# Patient Record
Sex: FEMALE | Race: WHITE | Employment: UNEMPLOYED | URBAN - METROPOLITAN AREA
[De-identification: names, ages, dates, MRNs, and addresses within clinical notes are randomized per-mention and may not be internally consistent; named-entity substitution may affect disease eponyms.]

---
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
## 2022-03-30 ENCOUNTER — OFFICE VISIT (OUTPATIENT)
Dept: FAMILY MEDICINE | Facility: CLINIC | Age: 22
End: 2022-03-30
Payer: COMMERCIAL

## 2022-03-30 VITALS
DIASTOLIC BLOOD PRESSURE: 70 MMHG | HEART RATE: 73 BPM | TEMPERATURE: 98.1 F | RESPIRATION RATE: 20 BRPM | SYSTOLIC BLOOD PRESSURE: 109 MMHG | WEIGHT: 95.6 LBS | OXYGEN SATURATION: 98 % | BODY MASS INDEX: 20.07 KG/M2

## 2022-03-30 DIAGNOSIS — F33.41 MAJOR DEPRESSIVE DISORDER, RECURRENT EPISODE, IN PARTIAL REMISSION (H): ICD-10-CM

## 2022-03-30 DIAGNOSIS — F90.0 ATTENTION DEFICIT HYPERACTIVITY DISORDER (ADHD), PREDOMINANTLY INATTENTIVE TYPE: Primary | ICD-10-CM

## 2022-03-30 PROCEDURE — 99214 OFFICE O/P EST MOD 30 MIN: CPT | Performed by: STUDENT IN AN ORGANIZED HEALTH CARE EDUCATION/TRAINING PROGRAM

## 2022-03-30 RX ORDER — BUPROPION HYDROCHLORIDE 100 MG/1
100 TABLET, EXTENDED RELEASE ORAL 2 TIMES DAILY
Qty: 30 TABLET | Refills: 1 | Status: SHIPPED | OUTPATIENT
Start: 2022-03-30 | End: 2022-04-20

## 2022-03-30 RX ORDER — ESCITALOPRAM OXALATE 20 MG/1
20 TABLET ORAL DAILY
Qty: 90 TABLET | Refills: 1 | Status: SHIPPED | OUTPATIENT
Start: 2022-03-30 | End: 2023-03-31

## 2022-03-30 ASSESSMENT — ANXIETY QUESTIONNAIRES
7. FEELING AFRAID AS IF SOMETHING AWFUL MIGHT HAPPEN: NOT AT ALL
5. BEING SO RESTLESS THAT IT IS HARD TO SIT STILL: SEVERAL DAYS
6. BECOMING EASILY ANNOYED OR IRRITABLE: NOT AT ALL
2. NOT BEING ABLE TO STOP OR CONTROL WORRYING: NOT AT ALL
1. FEELING NERVOUS, ANXIOUS, OR ON EDGE: SEVERAL DAYS
GAD7 TOTAL SCORE: 3
3. WORRYING TOO MUCH ABOUT DIFFERENT THINGS: NOT AT ALL

## 2022-03-30 ASSESSMENT — PATIENT HEALTH QUESTIONNAIRE - PHQ9
SUM OF ALL RESPONSES TO PHQ QUESTIONS 1-9: 19
5. POOR APPETITE OR OVEREATING: SEVERAL DAYS

## 2022-03-30 NOTE — PROGRESS NOTES
Chief Complaint   Patient presents with     Recheck Medication     medication change       There are no exam notes on file for this visit.        Assessment/Plan:  Beth Real is a 21 year old female here for follow-up of major depressive disorder as well as ADHD, with particular question about change of medication for ADHD.        # ADHD: Ms. Real has only ever been on methylphenidate for ADHD management and has not tried any alternative medication therapy.  We discussed the risks, benefits, possible side effect profiles of different medication regimens including Strattera, bupropion, guanfacine.  We discussed specifically that guanfacine tends to have a more sedating side effect profile while Wellbutrin can be helpful in individuals who have cooccurring depression.  At this time we engaged in shared decision-making and opted to pursue treatment with adjunctive bupropion.  We elected for the 12-hour formulation to begin and may transition to 24-hour formulation in the future.  Start at 100 mg daily in the morning.  Follow-up 2 weeks to assess response.  May taper up more quickly than every 4 weeks due to dual purpose for treating both depression and ADHD.    #Major depressive disorder: Maribel previously responded well to Lexapro but had been overly sedated throughout the day so attempted to transition to Prozac.  However, Prozac has been less efficacious for treatment of her depressive symptoms and she has had more prominent suicidal thoughts since transitioning back to Prozac.    -Based upon our discussion today we made the decision together to transition back to Lexapro 20 mg daily.  We will do a direct switch without cross-taper given the long half-life of Prozac.    Beth was seen today for recheck medication.    Diagnoses and all orders for this visit:    Attention deficit hyperactivity disorder (ADHD), predominantly inattentive type  -     buPROPion (WELLBUTRIN SR) 100 MG 12 hr tablet; Take 1 tablet  "(100 mg) by mouth 2 times daily    Major depressive disorder, recurrent episode, in partial remission (H)  -     escitalopram (LEXAPRO) 20 MG tablet; Take 1 tablet (20 mg) by mouth daily        Follow-up with Milagros Dave in 2.5 weeks for follow-up Wellbutrin start and Lexapro restart.    Future Appointments   Date Time Provider Department Center   3/30/2022  8:40 AM Milagros Dave MD [facility]       Milagros MORAES. MD [place]      There are no Patient Instructions on file for this visit.    Subjective:  Beth Real is a 21 year old female here for follow up for depression and ADHD.  She has been off school this semester -has decided to take a CNA class.  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  She has found her time of school a bit more, says that there is \"lots of doing nothing.\"  She is continuing to partake in some activities that she enjoys such as swing dancing every Thursday night.    She made the switch from Lexapro to Prozac at least 1 month ago.  She has been taking Prozac 30 mg daily and has found that it has not been as effective as Lexapro.  She notes that with Lexapro she saw more positive changes in day to day mood vs with Prozac  Had fewer suicidal thoughts on Lexapro.  Less motivated to do daily activities with Prozac as compared to Lexapro  Prozac there is \"less change from baseline depression.\"    She does seem to be sleeping better, however.  She has stopped hydroxyzine completely - taking melatonin now, 10 mg nightly  Wakes up around 3-4 am, then falls back asleep.  Stopped hydroxyzine around a week after starting Prozac -   Sleeping - wakes up 9:30 am, falls asleep around 10:30 pm-midnight, wakes up 3 am.    Regarding ADHD: Continues to be interested in finding an alternative to methylphenidate.  With ritalin - either very very focused or not at all.  Difficult with school work - had to do everything while effective and then all done when efficacy " wore off.  Hard time with eating, sleeping.  When she takes - lasts 6-8 hours, then drops off fast, even on ER formulation  When taking is glum -mom says she is much more pleasant to be around when she is not on methylphenidate.  Has not taken in a long time - since went on gap semester.    Somewhat interested in guanfacine -   Mom has a friend who reminds her of Maribel - 23 years old, started guanfacine around age 22, takes at night, no strong ups/downs, has hx eating disorder. Has significant anxiety.  Maribel has hx disordered eating late kayden/early jr high -   Also did binging/purging 7th, Some unsafe foods to this time but much less substantial part of her mental health at this time.      ROS: No HA, dizziness, abd discomfort, palpitations or SOB    PHQ 9/17/2021 10/15/2021 3/30/2022   PHQ-9 Total Score 23 22 19   Q9: Thoughts of better off dead/self-harm past 2 weeks More than half the days Not at all More than half the days   PHQ-A Total Score - - -   PHQ-A Depressed most days in past year - - -   PHQ-A Mood affect on daily activities - - -   PHQ-A Suicide Ideation past 2 weeks - - -   PHQ-A Suicide Ideation past month - - -   PHQ-A Previous suicide attempt - - -   Reports has suicidal thoughts but no plan or intent. Last self harm was Sept 2021.     SHAHRZAD-7 SCORE 9/17/2021 10/15/2021 3/30/2022   Total Score 16 10 3       Patient Active Problem List   Diagnosis     GHD (growth hormone deficiency) (H)     Short stature     Esotropia     Diplopia     Myopia with astigmatism     Attention deficit hyperactivity disorder (ADHD), predominantly inattentive type     Acute pain of right knee       Current Outpatient Medications   Medication     buPROPion (WELLBUTRIN SR) 100 MG 12 hr tablet     escitalopram (LEXAPRO) 20 MG tablet     escitalopram (LEXAPRO) 20 MG tablet     FLUoxetine (PROZAC) 10 MG capsule     FLUoxetine (PROZAC) 20 MG capsule     hydrOXYzine (ATARAX) 10 MG tablet     methylphenidate (METADATE ER) 20 MG CR  tablet     No current facility-administered medications for this visit.       Objective:  /70   Pulse 73   Temp 98.1  F (36.7  C) (Oral)   Resp 20   Wt 43.4 kg (95 lb 9.6 oz)   SpO2 98%   BMI 20.07 kg/m    Body mass index is 20.07 kg/m .  Gen: A/O x3, in NAD.  Cardio: S1, S2, no MRG. RRR.  Resp: CTAB, no WRR.  Neuro: Grossly intact  Psych: Bright, interactive

## 2022-03-31 ASSESSMENT — ANXIETY QUESTIONNAIRES: GAD7 TOTAL SCORE: 3

## 2022-04-20 ENCOUNTER — VIRTUAL VISIT (OUTPATIENT)
Dept: FAMILY MEDICINE | Facility: CLINIC | Age: 22
End: 2022-04-20
Payer: COMMERCIAL

## 2022-04-20 DIAGNOSIS — F33.2 SEVERE EPISODE OF RECURRENT MAJOR DEPRESSIVE DISORDER, WITHOUT PSYCHOTIC FEATURES (H): ICD-10-CM

## 2022-04-20 DIAGNOSIS — F90.0 ATTENTION DEFICIT HYPERACTIVITY DISORDER (ADHD), PREDOMINANTLY INATTENTIVE TYPE: Primary | ICD-10-CM

## 2022-04-20 PROBLEM — F33.9 MAJOR DEPRESSION, RECURRENT (H): Status: ACTIVE | Noted: 2022-04-20

## 2022-04-20 PROCEDURE — 99214 OFFICE O/P EST MOD 30 MIN: CPT | Mod: TEL | Performed by: STUDENT IN AN ORGANIZED HEALTH CARE EDUCATION/TRAINING PROGRAM

## 2022-04-20 RX ORDER — BUPROPION HYDROCHLORIDE 300 MG/1
300 TABLET ORAL EVERY MORNING
Qty: 53 TABLET | Refills: 0 | Status: SHIPPED | OUTPATIENT
Start: 2022-04-27 | End: 2023-04-19

## 2022-04-20 RX ORDER — BUPROPION HYDROCHLORIDE 150 MG/1
150 TABLET ORAL EVERY MORNING
Qty: 7 TABLET | Refills: 0 | Status: SHIPPED | OUTPATIENT
Start: 2022-04-20 | End: 2022-09-07

## 2022-04-20 ASSESSMENT — PATIENT HEALTH QUESTIONNAIRE - PHQ9: SUM OF ALL RESPONSES TO PHQ QUESTIONS 1-9: 12

## 2022-04-20 NOTE — PROGRESS NOTES
Family Practice Telehealth Clinic Visit: in Lieu of Clinic Visit for today (COVID-19 precautions)    Phone call outcome: Spoke with patient on the phone    Verify name: yes  Verify : yes  Patient consents to a telephone visit: yes    CC: Follow up ADHD, depression    S:    Pt feels like the last two weeks have been going pretty well as far as mood.  The Lexapro has been good for depression, noticing more motivation w waking up.  Much less suicidal thoughts on Lexapro.   Still motivated to do things like showering, brushing teeth, and eating.     The ADHD medication effect is not very effective, especially after ritalin, however.  Usually when focuses on something can get in mindset of focusing - with ritalin. But, mind is wandering a lot with the wellbutrin.  Willing to continue to try but would like to raise the dosage.  Taking Wellbutrin 100mg XR once daily.     O:  GEN: Speaking clearly in no audible distress  NEURO: A&Ox3  PSYCH: Appropriate mood & affect       A/P: Beth Real is a 21 year old female with history of major depressive disorder as well as ADHD, currently undergoing medication adjustment for ADHD due to side effects of Ritalin.  Mood is good on current Wellbutrin and Lexapro but patient has significantly decreased focus on her current medication.  We did discuss that dosage is quite low at this time and that it does take 4 to 6 weeks to reach a steady state with Wellbutrin and see its full effect.  Given that we are using it both for depression as well as ADHD we will titrate up the dosage a bit more quickly than usually done if used just for ADHD.  -Transition to Wellbutrin 150 mg every 24 hours in the morning.  Take for 4 to 7 days.  -Then increase to Wellbutrin 300 mg every 24 hours  -Patient can send me a message if she is seeing minimal effect on 300 mg dosage and we can consider increasing to 450 mg daily  -Otherwise follow-up with me in 6 weeks to assess response  -Reviewed possible  side effects  -Advised that if patient is noticing an increase in suicidal thoughts that she believes she may act upon that she should call 911 and/or tell her parents    Beth was seen today for follow up.    Diagnoses and all orders for this visit:    Attention deficit hyperactivity disorder (ADHD), predominantly inattentive type  -     buPROPion (WELLBUTRIN XL) 150 MG 24 hr tablet; Take 1 tablet (150 mg) by mouth every morning for 7 days  -     buPROPion (WELLBUTRIN XL) 300 MG 24 hr tablet; Take 1 tablet (300 mg) by mouth every morning    Severe episode of recurrent major depressive disorder, without psychotic features (H)      Total phone time 14 minutes    No future appointments.    Milagros Dave MD

## 2022-05-02 ENCOUNTER — LAB REQUISITION (OUTPATIENT)
Dept: LAB | Facility: CLINIC | Age: 22
End: 2022-05-02

## 2022-05-02 PROCEDURE — 86481 TB AG RESPONSE T-CELL SUSP: CPT | Performed by: INTERNAL MEDICINE

## 2022-05-04 LAB
GAMMA INTERFERON BACKGROUND BLD IA-ACNC: 0.06 IU/ML
M TB IFN-G BLD-IMP: NEGATIVE
M TB IFN-G CD4+ BCKGRND COR BLD-ACNC: 9.94 IU/ML
MITOGEN IGNF BCKGRD COR BLD-ACNC: -0.01 IU/ML
MITOGEN IGNF BCKGRD COR BLD-ACNC: -0.01 IU/ML
QUANTIFERON MITOGEN: 10 IU/ML
QUANTIFERON NIL TUBE: 0.06 IU/ML
QUANTIFERON TB1 TUBE: 0.05 IU/ML
QUANTIFERON TB2 TUBE: 0.05

## 2022-09-04 ENCOUNTER — MYC MEDICAL ADVICE (OUTPATIENT)
Dept: FAMILY MEDICINE | Facility: CLINIC | Age: 22
End: 2022-09-04

## 2022-12-26 ENCOUNTER — HEALTH MAINTENANCE LETTER (OUTPATIENT)
Age: 22
End: 2022-12-26

## 2023-02-01 DIAGNOSIS — G47.00 INSOMNIA, UNSPECIFIED TYPE: ICD-10-CM

## 2023-02-01 RX ORDER — HYDROXYZINE HYDROCHLORIDE 10 MG/1
TABLET, FILM COATED ORAL
Qty: 90 TABLET | Refills: 1 | Status: SHIPPED | OUTPATIENT
Start: 2023-02-01 | End: 2023-11-01

## 2023-04-19 ENCOUNTER — OFFICE VISIT (OUTPATIENT)
Dept: FAMILY MEDICINE | Facility: CLINIC | Age: 23
End: 2023-04-19
Payer: COMMERCIAL

## 2023-04-19 VITALS
BODY MASS INDEX: 17.84 KG/M2 | OXYGEN SATURATION: 99 % | RESPIRATION RATE: 16 BRPM | WEIGHT: 85 LBS | SYSTOLIC BLOOD PRESSURE: 77 MMHG | HEIGHT: 58 IN | HEART RATE: 82 BPM | DIASTOLIC BLOOD PRESSURE: 43 MMHG | TEMPERATURE: 98 F

## 2023-04-19 DIAGNOSIS — F90.0 ATTENTION DEFICIT HYPERACTIVITY DISORDER (ADHD), PREDOMINANTLY INATTENTIVE TYPE: ICD-10-CM

## 2023-04-19 DIAGNOSIS — F33.41 MAJOR DEPRESSIVE DISORDER, RECURRENT EPISODE, IN PARTIAL REMISSION (H): Primary | ICD-10-CM

## 2023-04-19 PROCEDURE — 99214 OFFICE O/P EST MOD 30 MIN: CPT | Mod: 25 | Performed by: STUDENT IN AN ORGANIZED HEALTH CARE EDUCATION/TRAINING PROGRAM

## 2023-04-19 PROCEDURE — 90715 TDAP VACCINE 7 YRS/> IM: CPT | Performed by: STUDENT IN AN ORGANIZED HEALTH CARE EDUCATION/TRAINING PROGRAM

## 2023-04-19 PROCEDURE — 90471 IMMUNIZATION ADMIN: CPT | Performed by: STUDENT IN AN ORGANIZED HEALTH CARE EDUCATION/TRAINING PROGRAM

## 2023-04-19 RX ORDER — ESCITALOPRAM OXALATE 10 MG/1
10 TABLET ORAL DAILY
Qty: 90 TABLET | Refills: 1 | Status: SHIPPED | OUTPATIENT
Start: 2023-04-19 | End: 2023-08-15

## 2023-04-19 RX ORDER — LEVONORGESTREL 1.5 MG/1
1.5 TABLET ORAL ONCE
Qty: 1 TABLET | Refills: 3 | Status: SHIPPED | OUTPATIENT
Start: 2023-04-19 | End: 2023-04-21

## 2023-04-19 ASSESSMENT — ANXIETY QUESTIONNAIRES
IF YOU CHECKED OFF ANY PROBLEMS ON THIS QUESTIONNAIRE, HOW DIFFICULT HAVE THESE PROBLEMS MADE IT FOR YOU TO DO YOUR WORK, TAKE CARE OF THINGS AT HOME, OR GET ALONG WITH OTHER PEOPLE: SOMEWHAT DIFFICULT
5. BEING SO RESTLESS THAT IT IS HARD TO SIT STILL: SEVERAL DAYS
GAD7 TOTAL SCORE: 6
6. BECOMING EASILY ANNOYED OR IRRITABLE: SEVERAL DAYS
GAD7 TOTAL SCORE: 6
2. NOT BEING ABLE TO STOP OR CONTROL WORRYING: SEVERAL DAYS
3. WORRYING TOO MUCH ABOUT DIFFERENT THINGS: SEVERAL DAYS
7. FEELING AFRAID AS IF SOMETHING AWFUL MIGHT HAPPEN: NOT AT ALL
1. FEELING NERVOUS, ANXIOUS, OR ON EDGE: SEVERAL DAYS

## 2023-04-19 ASSESSMENT — PATIENT HEALTH QUESTIONNAIRE - PHQ9
5. POOR APPETITE OR OVEREATING: SEVERAL DAYS
SUM OF ALL RESPONSES TO PHQ QUESTIONS 1-9: 10

## 2023-04-19 NOTE — NURSING NOTE
Prior to immunization administration, verified patients identity using patient s name and date of birth. Please see Immunization Activity for additional information.     Screening Questionnaire for Adult Immunization    Are you sick today?   No   Do you have allergies to medications, food, a vaccine component or latex?   No   Have you ever had a serious reaction after receiving a vaccination?   No   Do you have a long-term health problem with heart, lung, kidney, or metabolic disease (e.g., diabetes), asthma, a blood disorder, no spleen, complement component deficiency, a cochlear implant, or a spinal fluid leak?  Are you on long-term aspirin therapy?   No   Do you have cancer, leukemia, HIV/AIDS, or any other immune system problem?   No   Do you have a parent, brother, or sister with an immune system problem?   No   In the past 3 months, have you taken medications that affect  your immune system, such as prednisone, other steroids, or anticancer drugs; drugs for the treatment of rheumatoid arthritis, Crohn s disease, or psoriasis; or have you had radiation treatments?   No   Have you had a seizure, or a brain or other nervous system problem?   No   During the past year, have you received a transfusion of blood or blood    products, or been given immune (gamma) globulin or antiviral drug?   No   For women: Are you pregnant or is there a chance you could become       pregnant during the next month?   No   Have you received any vaccinations in the past 4 weeks?   No     Immunization questionnaire answers were all negative.      Injection of tdap given by Sabrina Mosquera. Patient instructed to remain in clinic for 15 minutes afterwards, and to report any adverse reactions.     Screening performed by Sabrina Mosquera on 4/19/2023 at 2:25 PM.

## 2023-04-19 NOTE — PROGRESS NOTES
Chief Complaint   Patient presents with     Medication Question     All meds that prescribed        There are no exam notes on file for this visit.        Assessment/Plan:  Beth Real is a 22 year old female here for medication management and evaluation of depression, anxiety, and ADHD, as well as an STD screening.    I discussed with the patient continuing a lowered dosage of escitalopram and maintains dosage of bupropion for mood stabilization. She has become sexually active and we reviewed the importance of having emergency contraception on hand. The patient agreed to and received TDAP vaccine in the clinic today.    Beth was seen today for medication question.    Diagnoses and all orders for this visit:    Major depressive disorder, recurrent episode, in partial remission (H)  -     escitalopram (LEXAPRO) 10 MG tablet; Take 1 tablet (10 mg) by mouth daily    Attention deficit hyperactivity disorder (ADHD), predominantly inattentive type  - Continue bupropion - no refills required    Other orders  -     TDAP VACCINE (Adacel, Boostrix)  [[id number]]          Follow-up with Milagros Dave in 6 weeks for medication management and evaluation of depression, anxiety, and ADHD, as well as an STD screening.    No future appointments.    Milagros Dave MD      There are no Patient Instructions on file for this visit.    Subjective:  Beth Real is a 22 year old female here for medication management and evaluation of depression, anxiety, and ADHD, as well as an STD screening    She was last evaluated in this clinic over a year ago.    The patient reports that she was recently accepted into the sterile processing program at [employer] and is currently looking for a job, but she is not currently working or in school at this time. She has been spending time outside of the house with friends.     Regarding her medications, she notices that the antidepressants dull her emotions. She states that her  cat  and she didn't cry, and then she stopped her medications and cried for days. She feels this is not a healthy response for herself. She reports that she has struggled to feeling emotions her entire life. The patient reports that lately her symptoms are more anxiety-predominant than depression-predominant. She has been inconsistent with medication compliance after losing her pills and she just stopped taking them. She currently has on hand escitalopram 20mg and hydroxyzine which she rarely takes to allow her to sleep, which she has only taken those medications together and not separately.    The patient reports that she is still struggling with ADHD symptoms. The patient patient reports that she previously prescribed Ritalin, upon which taking she felt emotionally numb. She feels a past prescription of bupropion controlled her symptoms well but that 300mg was too high of a dose.    The patient has been previously diagnosed with PTSD but currently denies any symptoms, including nightmares. She reports that she used to always be on guard but is now detached. She reports that therapy has improved those symptoms over the past year.    The patient reports being sexually active with one male partner. She reports feeling safe with that partner. The patient is currently menstruating and would like to delay STI screening at this time. The patient uses condoms as contraception.        10/15/2021     3:43 PM 3/30/2022     9:13 AM 2023     1:46 PM   SHAHRZAD-7 SCORE   Total Score 10 3 6         3/30/2022     9:13 AM 2022     4:13 PM 2023     1:46 PM   PHQ   PHQ-9 Total Score 19 12 10   Q9: Thoughts of better off dead/self-harm past 2 weeks More than half the days Not at all Not at all       Patient Active Problem List   Diagnosis     GHD (growth hormone deficiency) (H)     Short stature     Esotropia     Diplopia     Myopia with astigmatism     Attention deficit hyperactivity disorder (ADHD), predominantly  "inattentive type     Acute pain of right knee     Major depression, recurrent (H)       Current Outpatient Medications   Medication     escitalopram (LEXAPRO) 10 MG tablet     levonorgestrel (PLAN B) 1.5 MG tablet     buPROPion (WELLBUTRIN XL) 150 MG 24 hr tablet     hydrOXYzine (ATARAX) 10 MG tablet     No current facility-administered medications for this visit.       Objective:  BP (!) 77/43 (BP Location: Left arm, Patient Position: Sitting, Cuff Size: Adult Regular)   Pulse 82   Temp 98  F (36.7  C) (Oral)   Resp 16   Ht 1.462 m (4' 9.56\")   Wt 38.6 kg (85 lb)   LMP 04/17/2023 (Exact Date)   SpO2 99%   BMI 18.04 kg/m    Body mass index is 18.04 kg/m .  Gen: A/O x3, in NAD.  Cardio: S1, S2, no MRG. RRR.  Resp: CTAB, no WRR.  Ext: No edema of BLE.   Neuro: Grossly intact.    Scribe Disclosure:   I, Angella Man, am serving as a scribe to document services personally performed by Milagros Dave MD based on data collection and the provider's statements to me.     Provider Disclosure:  I agree with above History, Review of Systems, Physical exam and Plan.  I have reviewed the content of the documentation and have edited it as needed. I have personally performed the services documented here and the documentation accurately represents those services and the decisions I have made.      Electronically signed by:  Milagros Dave MD    "

## 2023-08-15 ENCOUNTER — OFFICE VISIT (OUTPATIENT)
Dept: FAMILY MEDICINE | Facility: CLINIC | Age: 23
End: 2023-08-15
Payer: COMMERCIAL

## 2023-08-15 VITALS
OXYGEN SATURATION: 98 % | HEART RATE: 77 BPM | RESPIRATION RATE: 18 BRPM | SYSTOLIC BLOOD PRESSURE: 107 MMHG | WEIGHT: 83.2 LBS | DIASTOLIC BLOOD PRESSURE: 68 MMHG | BODY MASS INDEX: 17.46 KG/M2 | TEMPERATURE: 98.2 F | HEIGHT: 58 IN

## 2023-08-15 DIAGNOSIS — F33.41 MAJOR DEPRESSIVE DISORDER, RECURRENT EPISODE, IN PARTIAL REMISSION (H): ICD-10-CM

## 2023-08-15 DIAGNOSIS — F90.0 ATTENTION DEFICIT HYPERACTIVITY DISORDER (ADHD), PREDOMINANTLY INATTENTIVE TYPE: ICD-10-CM

## 2023-08-15 DIAGNOSIS — Z11.3 SCREENING FOR STDS (SEXUALLY TRANSMITTED DISEASES): Primary | ICD-10-CM

## 2023-08-15 DIAGNOSIS — G47.00 INSOMNIA, UNSPECIFIED TYPE: ICD-10-CM

## 2023-08-15 DIAGNOSIS — R53.83 OTHER FATIGUE: ICD-10-CM

## 2023-08-15 LAB
ANION GAP SERPL CALCULATED.3IONS-SCNC: 12 MMOL/L (ref 7–15)
BUN SERPL-MCNC: 8.6 MG/DL (ref 6–20)
CALCIUM SERPL-MCNC: 9.6 MG/DL (ref 8.6–10)
CHLORIDE SERPL-SCNC: 103 MMOL/L (ref 98–107)
CREAT SERPL-MCNC: 0.57 MG/DL (ref 0.51–0.95)
DEPRECATED CALCIDIOL+CALCIFEROL SERPL-MC: 32 UG/L (ref 20–75)
DEPRECATED HCO3 PLAS-SCNC: 25 MMOL/L (ref 22–29)
ERYTHROCYTE [DISTWIDTH] IN BLOOD BY AUTOMATED COUNT: 14.5 % (ref 10–15)
FERRITIN SERPL-MCNC: 3 NG/ML (ref 6–175)
GFR SERPL CREATININE-BSD FRML MDRD: >90 ML/MIN/1.73M2
GLUCOSE SERPL-MCNC: 84 MG/DL (ref 70–99)
HCT VFR BLD AUTO: 35 % (ref 35–47)
HGB BLD-MCNC: 10.6 G/DL (ref 11.7–15.7)
HOLD SPECIMEN: NORMAL
IRON BINDING CAPACITY (ROCHE): 492 UG/DL (ref 240–430)
IRON SATN MFR SERPL: 4 % (ref 15–46)
IRON SERPL-MCNC: 20 UG/DL (ref 37–145)
MCH RBC QN AUTO: 24 PG (ref 26.5–33)
MCHC RBC AUTO-ENTMCNC: 30.3 G/DL (ref 31.5–36.5)
MCV RBC AUTO: 79 FL (ref 78–100)
PLATELET # BLD AUTO: 315 10E3/UL (ref 150–450)
POTASSIUM SERPL-SCNC: 3.9 MMOL/L (ref 3.4–5.3)
RBC # BLD AUTO: 4.41 10E6/UL (ref 3.8–5.2)
SODIUM SERPL-SCNC: 140 MMOL/L (ref 136–145)
TSH SERPL DL<=0.005 MIU/L-ACNC: 1.14 UIU/ML (ref 0.3–4.2)
WBC # BLD AUTO: 5.4 10E3/UL (ref 4–11)

## 2023-08-15 PROCEDURE — 83540 ASSAY OF IRON: CPT | Performed by: STUDENT IN AN ORGANIZED HEALTH CARE EDUCATION/TRAINING PROGRAM

## 2023-08-15 PROCEDURE — 83550 IRON BINDING TEST: CPT | Performed by: STUDENT IN AN ORGANIZED HEALTH CARE EDUCATION/TRAINING PROGRAM

## 2023-08-15 PROCEDURE — 82306 VITAMIN D 25 HYDROXY: CPT | Performed by: STUDENT IN AN ORGANIZED HEALTH CARE EDUCATION/TRAINING PROGRAM

## 2023-08-15 PROCEDURE — 36415 COLL VENOUS BLD VENIPUNCTURE: CPT | Performed by: STUDENT IN AN ORGANIZED HEALTH CARE EDUCATION/TRAINING PROGRAM

## 2023-08-15 PROCEDURE — 85027 COMPLETE CBC AUTOMATED: CPT | Performed by: STUDENT IN AN ORGANIZED HEALTH CARE EDUCATION/TRAINING PROGRAM

## 2023-08-15 PROCEDURE — 82728 ASSAY OF FERRITIN: CPT | Performed by: STUDENT IN AN ORGANIZED HEALTH CARE EDUCATION/TRAINING PROGRAM

## 2023-08-15 PROCEDURE — 84443 ASSAY THYROID STIM HORMONE: CPT | Performed by: STUDENT IN AN ORGANIZED HEALTH CARE EDUCATION/TRAINING PROGRAM

## 2023-08-15 PROCEDURE — 99214 OFFICE O/P EST MOD 30 MIN: CPT | Performed by: STUDENT IN AN ORGANIZED HEALTH CARE EDUCATION/TRAINING PROGRAM

## 2023-08-15 PROCEDURE — 80048 BASIC METABOLIC PNL TOTAL CA: CPT | Performed by: STUDENT IN AN ORGANIZED HEALTH CARE EDUCATION/TRAINING PROGRAM

## 2023-08-15 RX ORDER — ESCITALOPRAM OXALATE 10 MG/1
10 TABLET ORAL DAILY
Qty: 90 TABLET | Refills: 1 | Status: SHIPPED | OUTPATIENT
Start: 2023-08-15 | End: 2023-12-12

## 2023-08-15 RX ORDER — BUPROPION HYDROCHLORIDE 150 MG/1
TABLET ORAL
Qty: 90 TABLET | Refills: 3 | Status: SHIPPED | OUTPATIENT
Start: 2023-08-15 | End: 2023-12-12

## 2023-08-15 ASSESSMENT — PATIENT HEALTH QUESTIONNAIRE - PHQ9
5. POOR APPETITE OR OVEREATING: NOT AT ALL
SUM OF ALL RESPONSES TO PHQ QUESTIONS 1-9: 3

## 2023-08-15 ASSESSMENT — ANXIETY QUESTIONNAIRES
7. FEELING AFRAID AS IF SOMETHING AWFUL MIGHT HAPPEN: NOT AT ALL
1. FEELING NERVOUS, ANXIOUS, OR ON EDGE: NOT AT ALL
GAD7 TOTAL SCORE: 0
GAD7 TOTAL SCORE: 0
3. WORRYING TOO MUCH ABOUT DIFFERENT THINGS: NOT AT ALL
2. NOT BEING ABLE TO STOP OR CONTROL WORRYING: NOT AT ALL
6. BECOMING EASILY ANNOYED OR IRRITABLE: NOT AT ALL
5. BEING SO RESTLESS THAT IT IS HARD TO SIT STILL: NOT AT ALL

## 2023-08-15 NOTE — PATIENT INSTRUCTIONS
Try to increase nutrient density in your diet -   - peanut butter  - avocado  - eat 3 meals per day  - continue water

## 2023-08-15 NOTE — PROGRESS NOTES
Chief Complaint   Patient presents with    Recheck Medication    Counseling     Want to talk about dizziness and tired for few months  and daily life. 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        Assessment/Plan:  Beth Real is a 22 year old female here for fatigue and dizziness including presyncopal symptoms in her place and needing to take breaks from physical activity she previously did without difficulty.  Interview revealed that she has had decreased oral intake related to budgetary concerns and diminished appetite, corroborated by 10 pound weight loss over the past year.  Additional symptoms include cold intolerance and intermittent nausea.  No clear source of potential blood loss.  -Evaluate for anemia with CBC with reflex to iron studies  -Check electrolytes and TSH  -Discussed nutrient dense diet and increasing to 3 meals per day, bringing snacks to work, incorporating a good iron source into her diet  -Mood has been good, continue medications as current but consider that Wellbutrin may be diminishing her appetite  -Discussed insomnia management techniques/sleep hygiene  -Follow-up in 3 months for weight check and mood    Beth was seen today for recheck medication and counseling.    Diagnoses and all orders for this visit:    Screening for STDs (sexually transmitted diseases)    Attention deficit hyperactivity disorder (ADHD), predominantly inattentive type  -     buPROPion (WELLBUTRIN XL) 150 MG 24 hr tablet; TAKE 1 TABLET(150 MG) BY MOUTH EVERY MORNING    Major depressive disorder, recurrent episode, in partial remission (H)  -     escitalopram (LEXAPRO) 10 MG tablet; Take 1 tablet (10 mg) by mouth daily    Insomnia, unspecified type    Other fatigue  -     CBC with Platelets and Reflex to Iron Studies; Future  -     Basic metabolic panel; Future  -     TSH with free  "T4 reflex; Future  -     Vitamin D Deficiency; Future  -     CBC with Platelets and Reflex to Iron Studies  -     Basic metabolic panel  -     TSH with free T4 reflex  -     Vitamin D Deficiency  -     Iron & Iron Binding Capacity  -     Ferritin          No future appointments.    Milagros Dave MD      Patient Instructions   Try to increase nutrient density in your diet -   - peanut butter  - avocado  - eat 3 meals per day  - continue water      Subjective:  Beth Real is a 22 year old female here for follow up.  Also feeling faint/tired. Noticing with swing dancing, getting winded and has to take a break.   At work will suddenly get dizzy.   Lasts 2-30 minutes.   No bodily sx - BP and pulse   Sometimes has racing heart.   At work   Spots in vision -   No tingling in hands or feet.   Sometimes at desk and wave of dizziness  No fainting    No changes in appetite, drinking more water lately.   Used to get water through ice.   Only drinking water at work.   Eating a lot of carbs, on \"broke college student\" diet - ramen, cereal, bananas.   Makes ham sandwiches.    Has been a little underweight.   Was 95 now 83,   Doesn't have hunger signals. Will have sharp pain.   Hasn't paid attention    Yesterday had ramen with an egg  And a bagel sandwich -with ham, lujan  Water  Nectarine  No snacks in between.    Having periods, consistent, five days per month, come montly.   Not very heavy.      Some vomiting, on and off - 3x then weeks off, one time bloody specks but a bad cold.   No black stools.     Feels cold a lot even in 90 degree weather  No constipation.         Patient Active Problem List   Diagnosis    GHD (growth hormone deficiency) (H)    Short stature    Esotropia    Diplopia    Myopia with astigmatism    Attention deficit hyperactivity disorder (ADHD), predominantly inattentive type    Acute pain of right knee    Major depression, recurrent (H)       Current Outpatient Medications   Medication    " "buPROPion (WELLBUTRIN XL) 150 MG 24 hr tablet    escitalopram (LEXAPRO) 10 MG tablet    hydrOXYzine (ATARAX) 10 MG tablet     No current facility-administered medications for this visit.       Objective:  /68   Pulse 77   Temp 98.2  F (36.8  C) (Oral)   Resp 18   Ht 1.466 m (4' 9.72\")   Wt 37.7 kg (83 lb 3.2 oz)   SpO2 98%   BMI 17.56 kg/m    Body mass index is 17.56 kg/m .  Gen: A/O x3, in NAD.  HEENT: Without conjunctival pallor or sublingual pallor.  No thyromegaly or thyroid nodules palpated.  Palpable left posterior chain lymph nodes, patient states is chronic since she was a child and nontender.  Cardio: S1, S2, no MRG. RRR.  Slight pallor of palmar creases  Resp: CTAB, no WRR.  Ext: No edema of BLE. Cap refill <2 seconds.  No rashes.  Neuro: Grossly intact.    "

## 2023-08-16 DIAGNOSIS — D50.8 IRON DEFICIENCY ANEMIA SECONDARY TO INADEQUATE DIETARY IRON INTAKE: Primary | ICD-10-CM

## 2023-08-16 RX ORDER — FERROUS SULFATE 325(65) MG
325 TABLET ORAL EVERY OTHER DAY
Qty: 45 TABLET | Refills: 1 | Status: SHIPPED | OUTPATIENT
Start: 2023-08-16 | End: 2024-01-29

## 2023-10-10 ENCOUNTER — TELEPHONE (OUTPATIENT)
Dept: FAMILY MEDICINE | Facility: CLINIC | Age: 23
End: 2023-10-10
Payer: COMMERCIAL

## 2023-10-10 NOTE — TELEPHONE ENCOUNTER
Pt reports vaginal itching and blood tinged discharge with wiping x1.5 weeks following LMP. Denies burning with urination or discharge changes. Pt desires to be seen for vaginal exam to rule out UTI, BV, and yeast. Visit scheduled and other resources offered in case pt would like to be seen sooner for ongoing symptoms. ODILON Soler

## 2023-10-10 NOTE — TELEPHONE ENCOUNTER
[facility] phone call message-patient reporting a symptom:     Symptom: pt believes she has a UTI. She is wondering if labs could just be ordered by her doctor or if something could be called in.     Same Day Visit Offered: no    Additional comments:none    OK to leave message on voice mail? Yes    Primary language: English      needed? No    Call taken on October 10, 2023 at 9:32 AM by Zachariah De Jesus

## 2023-10-12 ENCOUNTER — OFFICE VISIT (OUTPATIENT)
Dept: FAMILY MEDICINE | Facility: CLINIC | Age: 23
End: 2023-10-12
Payer: COMMERCIAL

## 2023-10-12 VITALS
SYSTOLIC BLOOD PRESSURE: 99 MMHG | RESPIRATION RATE: 16 BRPM | HEART RATE: 74 BPM | BODY MASS INDEX: 18.7 KG/M2 | WEIGHT: 88.6 LBS | OXYGEN SATURATION: 99 % | DIASTOLIC BLOOD PRESSURE: 66 MMHG | TEMPERATURE: 98.2 F

## 2023-10-12 DIAGNOSIS — N89.8 VAGINAL ITCHING: Primary | ICD-10-CM

## 2023-10-12 DIAGNOSIS — N30.01 ACUTE CYSTITIS WITH HEMATURIA: ICD-10-CM

## 2023-10-12 LAB
ALBUMIN UR-MCNC: NEGATIVE MG/DL
APPEARANCE UR: CLEAR
BACTERIA #/AREA URNS HPF: ABNORMAL /HPF
BILIRUB UR QL STRIP: NEGATIVE
CLUE CELLS: ABNORMAL
COLOR UR AUTO: YELLOW
GLUCOSE UR STRIP-MCNC: NEGATIVE MG/DL
HGB UR QL STRIP: ABNORMAL
KETONES UR STRIP-MCNC: NEGATIVE MG/DL
LEUKOCYTE ESTERASE UR QL STRIP: ABNORMAL
NITRATE UR QL: NEGATIVE
PH UR STRIP: 6.5 [PH] (ref 5–8)
RBC #/AREA URNS AUTO: ABNORMAL /HPF
SP GR UR STRIP: 1.02 (ref 1–1.03)
SQUAMOUS #/AREA URNS AUTO: ABNORMAL /LPF
TRICHOMONAS, WET PREP: ABNORMAL
UROBILINOGEN UR STRIP-ACNC: 0.2 E.U./DL
WBC #/AREA URNS AUTO: ABNORMAL /HPF
WBC'S/HIGH POWER FIELD, WET PREP: ABNORMAL
YEAST, WET PREP: ABNORMAL

## 2023-10-12 PROCEDURE — 87210 SMEAR WET MOUNT SALINE/INK: CPT

## 2023-10-12 PROCEDURE — 99213 OFFICE O/P EST LOW 20 MIN: CPT | Mod: GC

## 2023-10-12 PROCEDURE — 81001 URINALYSIS AUTO W/SCOPE: CPT

## 2023-10-12 RX ORDER — NITROFURANTOIN 25; 75 MG/1; MG/1
100 CAPSULE ORAL 2 TIMES DAILY
Qty: 10 CAPSULE | Refills: 0 | Status: SHIPPED | OUTPATIENT
Start: 2023-10-12 | End: 2023-11-01

## 2023-10-12 NOTE — PROGRESS NOTES
Assessment & Plan     Acute cystitis with hematuria  Vaginal itching  Likely a UTI given positive leukocyte esterase in urine, bacteria, and WBCs. Negative Wet prep rules out BV, yeast infection, or trichomoniasis. Will start on Macrobid and change course if urine culture demonstrates lack of coverage.   - Wet preparation  - Urine Macroscopic with reflex to Microscopic  - Chlamydia trachomatis/Neisseria gonorrhoeae by PCR - Clinic Collect  - Urine Microscopic Exam  - nitroFURantoin macrocrystal-monohydrate (MACROBID) 100 MG capsule  Dispense: 10 capsule; Refill: 0    Return in about 1 week (around 10/19/2023), or if symptoms worsen or fail to improve. Encourage to schedule annual preventative appointment for Pap smear and preventative maintenance.     Cam Cueva [facility] ROBBIN Campos is a 23 year old, presenting for the following health issues:  Bladder Problems (Itchy, not completely emptying.)    ELMER Campos presents today with 1.5 weeks of vaginal itching. She denies any burning upon urination, urgency/increased frequency of urination, nor abdominal pain. She report a sweet smell of her urine, but no odor from her vulva. She uses a diva cup during her periods and believes she may have forgotten to completely clean it before using it again. She has no history of UTI. She has not had sexual intercourse for at least 6 months. Lastly, she reports a small drop of blood each time she wipes.      Review of Systems   Constitutional, HEENT, cardiovascular, pulmonary, gi and gu systems are negative, except as otherwise noted.      Objective    BP 99/66   Pulse 74   Temp 98.2  F (36.8  C) (Oral)   Resp 16   Wt 40.2 kg (88 lb 9.6 oz)   SpO2 99%   BMI 18.70 kg/m    Body mass index is 18.7 kg/m .  Physical Exam   GENERAL: healthy, alert and no distress  ABDOMEN: soft, nontender, no hepatosplenomegaly, no masses and bowel sounds normal    Results for orders placed or  performed in visit on 10/12/23 (from the past 24 hour(s))   Wet preparation    Specimen: Vagina; Swab   Result Value Ref Range    Trichomonas Absent Absent    Yeast Absent Absent    Clue Cells Absent Absent    WBCs/high power field 1+ (A) None    Narrative    Few bacteria; negative odor     Urine Macroscopic with reflex to Microscopic   Result Value Ref Range    Color Urine Yellow Colorless, Straw, Light Yellow, Yellow    Appearance Urine Clear Clear    Glucose Urine Negative Negative mg/dL    Bilirubin Urine Negative Negative    Ketones Urine Negative Negative mg/dL    Specific Gravity Urine 1.020 1.005 - 1.030    Blood Urine Small (A) Negative    pH Urine 6.5 5.0 - 8.0    Protein Albumin Urine Negative Negative mg/dL    Urobilinogen Urine 0.2 0.2, 1.0 E.U./dL    Nitrite Urine Negative Negative    Leukocyte Esterase Urine Large (A) Negative   Urine Microscopic Exam   Result Value Ref Range    Bacteria Urine Few (A) None Seen /HPF    RBC Urine 0-2 0-2 /HPF /HPF    WBC Urine  (A) 0-5 /HPF /HPF    Squamous Epithelials Urine Few (A) None Seen /LPF         Michael Wall MS3  Family Medicine  [facility]    Resident/Fellow Attestation   I, Cam Cueva DO, was present with the medical/JASPAL student who participated in the service and in the documentation of the note.  I have verified the history and personally performed the physical exam and medical decision making.  I agree with the assessment and plan of care as documented in the note.      Cam Cueva DO  PGY2

## 2023-10-12 NOTE — PATIENT INSTRUCTIONS
Thank you for coming in to see us today!    - Take the antibiotic twice daily for 5 days  - Return in 1 week if symptoms do not improve    Cam Cueva, DO

## 2023-10-12 NOTE — PROGRESS NOTES
Preceptor Attestation:  I discussed the patient with the resident and evaluated the patient in person. I have verified the content of the note, which accurately reflects my assessment of the patient and the plan of care.  Supervising Physician:  Doreen Cancino MD.

## 2023-11-01 ENCOUNTER — OFFICE VISIT (OUTPATIENT)
Dept: FAMILY MEDICINE | Facility: CLINIC | Age: 23
End: 2023-11-01
Payer: COMMERCIAL

## 2023-11-01 ENCOUNTER — TELEPHONE (OUTPATIENT)
Dept: FAMILY MEDICINE | Facility: CLINIC | Age: 23
End: 2023-11-01

## 2023-11-01 VITALS
BODY MASS INDEX: 19.33 KG/M2 | DIASTOLIC BLOOD PRESSURE: 69 MMHG | RESPIRATION RATE: 18 BRPM | TEMPERATURE: 98 F | SYSTOLIC BLOOD PRESSURE: 104 MMHG | OXYGEN SATURATION: 100 % | HEART RATE: 80 BPM | WEIGHT: 91.6 LBS

## 2023-11-01 DIAGNOSIS — G47.00 INSOMNIA, UNSPECIFIED TYPE: ICD-10-CM

## 2023-11-01 DIAGNOSIS — R10.9 ABDOMINAL CRAMPING: Primary | ICD-10-CM

## 2023-11-01 LAB
ALBUMIN UR-MCNC: NEGATIVE MG/DL
APPEARANCE UR: CLEAR
BACTERIA #/AREA URNS HPF: ABNORMAL /HPF
BILIRUB UR QL STRIP: NEGATIVE
CLUE CELLS: NORMAL
COLOR UR AUTO: YELLOW
GLUCOSE UR STRIP-MCNC: NEGATIVE MG/DL
HCG UR QL: NEGATIVE
HGB UR QL STRIP: ABNORMAL
KETONES UR STRIP-MCNC: NEGATIVE MG/DL
LEUKOCYTE ESTERASE UR QL STRIP: NEGATIVE
MUCOUS THREADS #/AREA URNS LPF: PRESENT /LPF
NITRATE UR QL: NEGATIVE
PH UR STRIP: 6.5 [PH] (ref 5–8)
RBC #/AREA URNS AUTO: ABNORMAL /HPF
SP GR UR STRIP: 1.02 (ref 1–1.03)
SQUAMOUS #/AREA URNS AUTO: ABNORMAL /LPF
T PALLIDUM AB SER QL: NONREACTIVE
TRICHOMONAS, WET PREP: NORMAL
UROBILINOGEN UR STRIP-ACNC: 1 E.U./DL
WBC #/AREA URNS AUTO: ABNORMAL /HPF
WBC'S/HIGH POWER FIELD, WET PREP: NORMAL
YEAST, WET PREP: NORMAL

## 2023-11-01 PROCEDURE — 87491 CHLMYD TRACH DNA AMP PROBE: CPT

## 2023-11-01 PROCEDURE — 81025 URINE PREGNANCY TEST: CPT

## 2023-11-01 PROCEDURE — 36415 COLL VENOUS BLD VENIPUNCTURE: CPT

## 2023-11-01 PROCEDURE — 99214 OFFICE O/P EST MOD 30 MIN: CPT | Mod: GC

## 2023-11-01 PROCEDURE — 81001 URINALYSIS AUTO W/SCOPE: CPT

## 2023-11-01 PROCEDURE — 87210 SMEAR WET MOUNT SALINE/INK: CPT

## 2023-11-01 PROCEDURE — 86780 TREPONEMA PALLIDUM: CPT

## 2023-11-01 PROCEDURE — 87591 N.GONORRHOEAE DNA AMP PROB: CPT

## 2023-11-01 RX ORDER — FAMOTIDINE 10 MG
10 TABLET ORAL 2 TIMES DAILY
Qty: 30 TABLET | Refills: 0 | Status: SHIPPED | OUTPATIENT
Start: 2023-11-01 | End: 2023-12-12

## 2023-11-01 RX ORDER — NAPROXEN 500 MG/1
500 TABLET ORAL 2 TIMES DAILY WITH MEALS
Qty: 60 TABLET | Refills: 0 | Status: SHIPPED | OUTPATIENT
Start: 2023-11-01 | End: 2023-11-02

## 2023-11-01 RX ORDER — HYDROXYZINE HYDROCHLORIDE 10 MG/1
TABLET, FILM COATED ORAL
Qty: 90 TABLET | Refills: 1 | Status: SHIPPED | OUTPATIENT
Start: 2023-11-01 | End: 2023-11-09

## 2023-11-01 ASSESSMENT — ANXIETY QUESTIONNAIRES
GAD7 TOTAL SCORE: 19
7. FEELING AFRAID AS IF SOMETHING AWFUL MIGHT HAPPEN: NEARLY EVERY DAY
5. BEING SO RESTLESS THAT IT IS HARD TO SIT STILL: NEARLY EVERY DAY
GAD7 TOTAL SCORE: 19
2. NOT BEING ABLE TO STOP OR CONTROL WORRYING: NEARLY EVERY DAY
6. BECOMING EASILY ANNOYED OR IRRITABLE: SEVERAL DAYS
1. FEELING NERVOUS, ANXIOUS, OR ON EDGE: NEARLY EVERY DAY
3. WORRYING TOO MUCH ABOUT DIFFERENT THINGS: NEARLY EVERY DAY

## 2023-11-01 ASSESSMENT — PATIENT HEALTH QUESTIONNAIRE - PHQ9
SUM OF ALL RESPONSES TO PHQ QUESTIONS 1-9: 19
5. POOR APPETITE OR OVEREATING: NEARLY EVERY DAY

## 2023-11-01 NOTE — PATIENT INSTRUCTIONS
Nice to see you today!    Here's what we talked about:  - I will MyChart message you either if it's an urgent result or when all the results come back.  - You can  the naproxen and famotidine from the pharmacy.  - Go to the ER if you can't keep food down, have new symptoms, or your pain does not go away when it comes but gets worse and worse.

## 2023-11-01 NOTE — TELEPHONE ENCOUNTER
[facility] phone call message- general phone call:    Reason for call: she would like a call back re abdominal pain that is getting worse she was told to go to the ER.    Action desired: call back.    Return call needed: Yes    OK to leave a message on voice mail? Yes    Advised patient to response may take up to 2 business days: Yes    Primary language: English      needed? No    Call taken on November 1, 2023 at 4:49 PM by Kali Fierro

## 2023-11-01 NOTE — PROGRESS NOTES
Assessment & Plan     Abdominal cramping  Unclear etiology of cramping with initial nausea.  Differential diagnosis includes STD or pregnancy given that patient was recently sexually active without contraception (though did also recently finish menses), other less likely but can't miss diagnoses include ectopic or ovarian torsion given the severe episodic nature of the pain.  Seems less likely GERD even though pain is in left upper quadrant as pain is not described as burning.  However, would still be okay to trial famotidine to see if this improves symptoms.  Also has a recent history of potential UTI where symptoms resolved on their own/did not take antibiotics.  Had also considered mononucleosis given that pain is in left upper quadrant where spleen is; this seems less likely as pain has been occurring for the past 11 days and patient did not have any URI symptoms recently.  Less likely bowel obstruction as patient recently had a bowel movement with soft stool.  We will also prescribe naproxen as it is stronger than ibuprofen.  - Wet Prep  - Chlamydia trachomatis PCR  - Neisseria Gonorrhoeae PCR  - Syphilis Screen [place] - RPR  - UA Macroscopic with reflex to Microscopic and Culture  - naproxen (NAPROSYN) 500 MG tablet  Dispense: 60 tablet; Refill: 0  - HCG qualitative urine  - famotidine (PEPCID) 10 MG tablet  Dispense: 30 tablet; Refill: 0  - Return precautions discussed  - RTC in 1 week for further follow up after lab results    Insomnia, unspecified type  Refilled hydroxyzine.  - hydrOXYzine (ATARAX) 10 MG tablet  Dispense: 90 tablet; Refill: 1      Return in about 1 week (around 11/8/2023) for Follow up.    Patient precepted with Dr. Oleary.    Joanie Barker MD  [facility]   Beth Real is a 23 year old female presenting for the following health issues:    HPI  Patient presents for abdominal pain. Present with boyfriend. Abdominal pain started ~10 days ago; was  menstruating at that time but does not normally get menstrual cramps. Abdominal pain continued after menstruation ended last week. Pain mostly in LUQ, sometimes in lower quadrants, and sometimes radiates to the back. Pain intensity comes and goes, dull at baseline but then will get intense sharp pain for 5-10 minutes per episode a few times a day. Episodes have been happening with more frequency the past week. Inspiration hurts more. Eating does not affect pain. Had some nausea at the beginning this has resolved. Has been taking ibuprofen, Pepto Bismol, laxative, and is planning to try Prilosec today. Had a BM yesterday with soft stool but this did not improve symptoms. Has not had this pain before. Was previously seen on 10/12 for incomplete bladder emptying but did not take Macrobid prescribed and symptoms spontaneously resolved. No dysuria, vaginal discharge, vaginal itching. Last sexually active 10/18 with boyfriend. Not using contraception. No recent URI symptoms.    Review of Systems   Pertinent positives and negatives as noted in HPI.        Objective    /69   Pulse 80   Temp 98  F (36.7  C)   Resp 18   Wt 41.5 kg (91 lb 9.6 oz)   SpO2 100%   BMI 19.33 kg/m    Body mass index is 19.33 kg/m .  Physical Exam   GENERAL: alert and no distress  NECK: no cervical lymphadenopathy  RESP: lungs clear to auscultation  CV: regular rate and rhythm, normal S1 S2  ABDOMEN: soft, nontender, bowel sounds normal  MS: no gross musculoskeletal defects noted  SKIN: no suspicious lesions or rashes  NEURO: Normal strength and tone, mentation intact and speech normal  PSYCH: mentation appears normal, affect normal      ----- Service Performed and Documented by Resident or Fellow ------

## 2023-11-01 NOTE — PROGRESS NOTES
Preceptor Attestation:    I discussed the patient with the resident and evaluated the patient in person. I have verified the content of the note, which accurately reflects my assessment of the patient and the plan of care.   Supervising Physician:  Carlos Oleary DO.

## 2023-11-02 ENCOUNTER — OFFICE VISIT (OUTPATIENT)
Dept: PEDIATRICS | Facility: CLINIC | Age: 23
End: 2023-11-02
Payer: COMMERCIAL

## 2023-11-02 VITALS
RESPIRATION RATE: 14 BRPM | SYSTOLIC BLOOD PRESSURE: 102 MMHG | HEART RATE: 72 BPM | DIASTOLIC BLOOD PRESSURE: 64 MMHG | OXYGEN SATURATION: 100 % | TEMPERATURE: 97.6 F

## 2023-11-02 DIAGNOSIS — R10.12 LUQ ABDOMINAL PAIN: Primary | ICD-10-CM

## 2023-11-02 LAB
ALBUMIN SERPL-MCNC: 3.4 G/DL (ref 3.4–5)
ALP SERPL-CCNC: 53 U/L (ref 40–150)
ALT SERPL W P-5'-P-CCNC: 36 U/L (ref 0–50)
AMYLASE SERPL-CCNC: 47 U/L (ref 28–100)
ANION GAP SERPL CALCULATED.3IONS-SCNC: 7 MMOL/L (ref 3–14)
AST SERPL W P-5'-P-CCNC: 32 U/L (ref 0–45)
BASOPHILS # BLD AUTO: 0 10E3/UL (ref 0–0.2)
BASOPHILS NFR BLD AUTO: 0 %
BILIRUB SERPL-MCNC: 0.5 MG/DL (ref 0.2–1.3)
BUN SERPL-MCNC: 11 MG/DL (ref 7–30)
C TRACH DNA SPEC QL PROBE+SIG AMP: NEGATIVE
CALCIUM SERPL-MCNC: 9.2 MG/DL (ref 8.5–10.1)
CHLORIDE BLD-SCNC: 107 MMOL/L (ref 94–109)
CO2 SERPL-SCNC: 28 MMOL/L (ref 20–32)
CREAT SERPL-MCNC: 0.5 MG/DL (ref 0.52–1.04)
CRP SERPL-MCNC: 5.5 MG/L
EGFRCR SERPLBLD CKD-EPI 2021: >90 ML/MIN/1.73M2
EOSINOPHIL # BLD AUTO: 0.1 10E3/UL (ref 0–0.7)
EOSINOPHIL NFR BLD AUTO: 2 %
ERYTHROCYTE [DISTWIDTH] IN BLOOD BY AUTOMATED COUNT: 19.8 % (ref 10–15)
GLUCOSE BLD-MCNC: 87 MG/DL (ref 70–99)
HCT VFR BLD AUTO: 36.5 % (ref 35–47)
HGB BLD-MCNC: 11.6 G/DL (ref 11.7–15.7)
IMM GRANULOCYTES # BLD: 0 10E3/UL
IMM GRANULOCYTES NFR BLD: 0 %
LIPASE SERPL-CCNC: 39 U/L (ref 13–60)
LYMPHOCYTES # BLD AUTO: 1.5 10E3/UL (ref 0.8–5.3)
LYMPHOCYTES NFR BLD AUTO: 22 %
MCH RBC QN AUTO: 26.7 PG (ref 26.5–33)
MCHC RBC AUTO-ENTMCNC: 31.8 G/DL (ref 31.5–36.5)
MCV RBC AUTO: 84 FL (ref 78–100)
MONOCYTES # BLD AUTO: 0.5 10E3/UL (ref 0–1.3)
MONOCYTES NFR BLD AUTO: 7 %
N GONORRHOEA DNA SPEC QL NAA+PROBE: NEGATIVE
N GONORRHOEA DNA SPEC QL NAA+PROBE: NEGATIVE
NEUTROPHILS # BLD AUTO: 4.7 10E3/UL (ref 1.6–8.3)
NEUTROPHILS NFR BLD AUTO: 69 %
PLATELET # BLD AUTO: 231 10E3/UL (ref 150–450)
POTASSIUM BLD-SCNC: 4.3 MMOL/L (ref 3.4–5.3)
PROT SERPL-MCNC: 7.1 G/DL (ref 6.8–8.8)
RBC # BLD AUTO: 4.34 10E6/UL (ref 3.8–5.2)
SODIUM SERPL-SCNC: 142 MMOL/L (ref 133–144)
TSH SERPL DL<=0.005 MIU/L-ACNC: 1.01 UIU/ML (ref 0.3–4.2)
WBC # BLD AUTO: 6.9 10E3/UL (ref 4–11)

## 2023-11-02 PROCEDURE — 82150 ASSAY OF AMYLASE: CPT | Performed by: FAMILY MEDICINE

## 2023-11-02 PROCEDURE — 83690 ASSAY OF LIPASE: CPT | Performed by: FAMILY MEDICINE

## 2023-11-02 PROCEDURE — 84443 ASSAY THYROID STIM HORMONE: CPT | Performed by: FAMILY MEDICINE

## 2023-11-02 PROCEDURE — 36415 COLL VENOUS BLD VENIPUNCTURE: CPT | Performed by: FAMILY MEDICINE

## 2023-11-02 PROCEDURE — 80053 COMPREHEN METABOLIC PANEL: CPT | Performed by: FAMILY MEDICINE

## 2023-11-02 PROCEDURE — 86140 C-REACTIVE PROTEIN: CPT | Performed by: FAMILY MEDICINE

## 2023-11-02 PROCEDURE — 99214 OFFICE O/P EST MOD 30 MIN: CPT | Performed by: FAMILY MEDICINE

## 2023-11-02 PROCEDURE — 85025 COMPLETE CBC W/AUTO DIFF WBC: CPT | Performed by: FAMILY MEDICINE

## 2023-11-02 RX ORDER — MELOXICAM 15 MG/1
15 TABLET ORAL DAILY
Qty: 30 TABLET | Refills: 0 | Status: SHIPPED | OUTPATIENT
Start: 2023-11-02 | End: 2023-12-12

## 2023-11-02 ASSESSMENT — PAIN SCALES - GENERAL: PAINLEVEL: MODERATE PAIN (4)

## 2023-11-02 NOTE — PROGRESS NOTES
Assessment & Plan     LUQ abdominal pain  Labs reviewed from yesterday and today.  Will await H pylori results.  Low suspicion for colon blockage due to recent BM and ongoing passing gas.  No pain today in LLQ, CVAT, LUQ.  Discussed recent ibuprofen use increase as well as ongoing for recurrent headaches and will trial prilosec twice a day and await further labs.  Non surgical/acute abdomen and discussed imaging with patient.  With normal labs patient was comfortable trying medication and then if worsening symptoms will return to clinic or go to ER.    - CBC with platelets differential  - Amylase  - Comprehensive metabolic panel  - Lipase  - TSH with free T4 reflex  - CRP inflammation  - Helicobacter pylori Antigen Stool  - CRP inflammation  - TSH with free T4 reflex  - Lipase  - Comprehensive metabolic panel  - Amylase  - CBC with platelets differential  - Helicobacter pylori Antigen Stool  - omeprazole (PRILOSEC) 20 MG DR capsule  Dispense: 30 capsule; Refill: 0  - meloxicam (MOBIC) 15 MG tablet  Dispense: 30 tablet; Refill: 0               Nicotine/Tobacco Cessation:  She reports that she has been smoking cigarettes. She has never used smokeless tobacco.  Nicotine/Tobacco Cessation Plan:             No follow-ups on file.    Michelle Silveira MD  [facility]    Estiven Campos is a 23 year old, presenting for the following health issues:  Abdominal Pain (Started during period)        11/1/2023    10:15 AM   Additional Questions   Roomed by Ursula PAYNE     Abdominal/Flank Pain  Onset/Duration: 12 days ago, during your period  Description:   Character: Dull ache and radiating  Location: left upper quadrant lower back  Radiation: No. Per patient, the lower back pain is separate from the abd pain.  Intensity: moderate, severe, 8/10  Progression of Symptoms:  worsening and intermittent  Accompanying Signs & Symptoms:  Fever/chills: no   Gas/Bloating: no   Nausea: YES  Vomitting: no    Diarrhea: no   Constipation:YES  Dysuria: no            Hematuria: YES- Yesterday           Frequency: no            Incontinence of urine: no   History:            Last bowel movement: two days ago  Trauma: no   Previous similar pain: no    Previous tests done: Labs, UA and Wet prep done yesterday           Previous Abdominal surgery: no   Precipitating factors:   Does the pain change with:     Food: no      Bowel Movement: no     Urination: no              Other factors: YES- per patient, breathing and moving/physical movement of the area  Therapies tried and outcome:  Ibuprofen 600mg,Pepto, Tums, Laxative for constipation. Naproxen - felt no improvement from it    When food last eaten: 30 min ago, yogurt in the car    No worsening factors.  Pain 5/10 baseline and then 8/10 for five minutes and will go a few hours between episodes.  Not worsened with food or drinking.  Pain currently 0/10.  Cramping pain.  Last BM 2 days ago which normal for her.  Hx of constipation.  Pain down to the left lower quadrant.  Nothing makes the pain better.  Alcohol drinker, 1-2 drinks 3 times/week.  Weed occasionally; helped with the pain yesterday.  Typically take ibuprofen 600mg every day with this episode.  No tylenol.  No abnormal tastes, no fevers or chills.  No dysuria, no pain with BM. Periods regular, every 20 days.  No nausea, no foods that hurt.    No hx of surgeries.  Took prilosec yesterday instead of the famotidine and didn't feel like it changed anything.    Review of Systems   Constitutional, HEENT, cardiovascular, pulmonary, gi and gu systems are negative, except as otherwise noted.      Objective    /64 (BP Location: Right arm, Patient Position: Sitting, Cuff Size: Adult Small)   Pulse 72   Temp 97.6  F (36.4  C) (Oral)   Resp 14   SpO2 100%   There is no height or weight on file to calculate BMI.  Physical Exam   GENERAL: healthy, alert and no distress  NECK: no adenopathy, no asymmetry, masses, or scars  and thyroid normal to palpation  RESP: lungs clear to auscultation - no rales, rhonchi or wheezes  CV: regular rate and rhythm, normal S1 S2, no S3 or S4, no murmur, click or rub, no peripheral edema and peripheral pulses strong  ABDOMEN: soft, nontender, no hepatosplenomegaly, no masses and bowel sounds normal, negative Swift's sign, negative Rovsing's, non acute abdomen   MS: no gross musculoskeletal defects noted, no edema, no CVA tenderness    Results for orders placed or performed in visit on 11/02/23 (from the past 24 hour(s))   CRP inflammation   Result Value Ref Range    CRP Inflammation 5.50 (H) <5.00 mg/L   TSH with free T4 reflex   Result Value Ref Range    TSH 1.01 0.30 - 4.20 uIU/mL   Lipase   Result Value Ref Range    Lipase 39 13 - 60 U/L   Comprehensive metabolic panel   Result Value Ref Range    Sodium 142 133 - 144 mmol/L    Potassium 4.3 3.4 - 5.3 mmol/L    Chloride 107 94 - 109 mmol/L    Carbon Dioxide (CO2) 28 20 - 32 mmol/L    Anion Gap 7 3 - 14 mmol/L    Urea Nitrogen 11 7 - 30 mg/dL    Creatinine 0.50 (L) 0.52 - 1.04 mg/dL    Calcium 9.2 8.5 - 10.1 mg/dL    Glucose 87 70 - 99 mg/dL    Alkaline Phosphatase 53 40 - 150 U/L    AST 32 0 - 45 U/L    ALT 36 0 - 50 U/L    Protein Total 7.1 6.8 - 8.8 g/dL    Albumin 3.4 3.4 - 5.0 g/dL    Bilirubin Total 0.5 0.2 - 1.3 mg/dL    GFR Estimate >90 >60 mL/min/1.73m2   Amylase   Result Value Ref Range    Amylase 47 28 - 100 U/L   CBC with platelets differential    Narrative    The following orders were created for panel order CBC with platelets differential.  Procedure                               Abnormality         Status                     ---------                               -----------         ------                     CBC with platelets and d...[[id number]]  Abnormal            Final result                 Please view results for these tests on the individual orders.   CBC with platelets and differential   Result Value Ref Range    WBC Count 6.9  4.0 - 11.0 10e3/uL    RBC Count 4.34 3.80 - 5.20 10e6/uL    Hemoglobin 11.6 (L) 11.7 - 15.7 g/dL    Hematocrit 36.5 35.0 - 47.0 %    MCV 84 78 - 100 fL    MCH 26.7 26.5 - 33.0 pg    MCHC 31.8 31.5 - 36.5 g/dL    RDW 19.8 (H) 10.0 - 15.0 %    Platelet Count 231 150 - 450 10e3/uL    % Neutrophils 69 %    % Lymphocytes 22 %    % Monocytes 7 %    % Eosinophils 2 %    % Basophils 0 %    % Immature Granulocytes 0 %    Absolute Neutrophils 4.7 1.6 - 8.3 10e3/uL    Absolute Lymphocytes 1.5 0.8 - 5.3 10e3/uL    Absolute Monocytes 0.5 0.0 - 1.3 10e3/uL    Absolute Eosinophils 0.1 0.0 - 0.7 10e3/uL    Absolute Basophils 0.0 0.0 - 0.2 10e3/uL    Absolute Immature Granulocytes 0.0 <=0.4 10e3/uL

## 2023-11-02 NOTE — TELEPHONE ENCOUNTER
Referral to Acute and Diagnostic Services    [phone] ([place]) [id number] [facility], [address], [place]  [address]    Transition to Acute & Diagnostic Services Clinic has been discussed with patient, and she agrees with next level of care.   Patient understands that evaluation/treatment at [facility] typically takes significantly longer than in clinic/urgent care (>2 hours).  The [facility] has been contacted by provider/staff to confirm patient acceptance.         Special issues:      None                           Pt called in today regarding ongoing abdominal symptoms. She states she has been experiencing LUQ pain that is more frequent within the last 24 hours. She states she has been having regular bowel movements. No relief with OTC treatment. Pain has been occurring for more then 10 days. No vaginal symptoms noted at most recent exam.     Reports called to [facility] who is accepting her as a patient today. They state they will call her now to speak further and alert her they are ready for her to be seen.     ODILON Soler

## 2023-11-04 ENCOUNTER — MYC MEDICAL ADVICE (OUTPATIENT)
Dept: FAMILY MEDICINE | Facility: CLINIC | Age: 23
End: 2023-11-04
Payer: COMMERCIAL

## 2023-11-04 DIAGNOSIS — R10.9 ABDOMINAL CRAMPING: Primary | ICD-10-CM

## 2023-11-05 ENCOUNTER — TELEPHONE (OUTPATIENT)
Dept: ADMINISTRATIVE | Facility: CLINIC | Age: 23
End: 2023-11-05
Payer: COMMERCIAL

## 2023-11-05 DIAGNOSIS — R10.12 LUQ ABDOMINAL PAIN: ICD-10-CM

## 2023-11-05 NOTE — TELEPHONE ENCOUNTER
FAX [facility] 90 Day Prescription Request    OMEPRAZOLE 20 MG CAPSULE    ORIG QTY: 30    QTY REQUESTED: 90    Last Visit : [facility] 11/02/2023

## 2023-11-09 ENCOUNTER — OFFICE VISIT (OUTPATIENT)
Dept: FAMILY MEDICINE | Facility: CLINIC | Age: 23
End: 2023-11-09
Payer: COMMERCIAL

## 2023-11-09 ENCOUNTER — TELEPHONE (OUTPATIENT)
Dept: FAMILY MEDICINE | Facility: CLINIC | Age: 23
End: 2023-11-09

## 2023-11-09 VITALS
RESPIRATION RATE: 20 BRPM | HEIGHT: 59 IN | DIASTOLIC BLOOD PRESSURE: 61 MMHG | WEIGHT: 94.6 LBS | BODY MASS INDEX: 19.07 KG/M2 | OXYGEN SATURATION: 97 % | HEART RATE: 77 BPM | SYSTOLIC BLOOD PRESSURE: 95 MMHG | TEMPERATURE: 98.7 F

## 2023-11-09 DIAGNOSIS — F90.0 ATTENTION DEFICIT HYPERACTIVITY DISORDER (ADHD), PREDOMINANTLY INATTENTIVE TYPE: ICD-10-CM

## 2023-11-09 DIAGNOSIS — G47.00 INSOMNIA, UNSPECIFIED TYPE: ICD-10-CM

## 2023-11-09 DIAGNOSIS — R10.9 ABDOMINAL CRAMPING: Primary | ICD-10-CM

## 2023-11-09 PROCEDURE — 99214 OFFICE O/P EST MOD 30 MIN: CPT | Mod: GC

## 2023-11-09 RX ORDER — HYDROXYZINE HYDROCHLORIDE 10 MG/1
10-20 TABLET, FILM COATED ORAL EVERY 8 HOURS PRN
Qty: 90 TABLET | Refills: 1 | Status: SHIPPED | OUTPATIENT
Start: 2023-11-09

## 2023-11-09 NOTE — PROGRESS NOTES
Assessment & Plan     Abdominal cramping  Appears to be improved since starting meloxicam and omeprazole.  CT abdomen scheduled in 2 days.  Discussed trying to use MiraLAX to help with bowel movements and obtaining H. pylori stool test.  Advised following up with Dr. Silveira after CT to continue further work-up and care instead of going to 2 providers.    Insomnia, unspecified type  Switch to hydroxyzine prescription to be used 3 times daily as needed for anxiety and insomnia.  - hydrOXYzine (ATARAX) 10 MG tablet  Dispense: 90 tablet; Refill: 1    Attention deficit hyperactivity disorder (ADHD), predominantly inattentive type  Patient previously was diagnosed with ADHD in childhood but is currently not taking any ADHD medication.  Is interested in being evaluated for adult ADHD.  Will route note to clinic psychologist to discuss adult ADHD eval visit here in clinic with her PCP, and adult ADHD psychologist community resources were also provided in AVS.    Return at patient's convenience to discuss adult ADHD as well as annual physical/Pap smear with her PCP.    Patient precepted with Dr. Garcia.    Joanie Barker MD  [facility]   Beth Real is a 23 year old female presenting for the following health issues:    HPI  Patient last seen by me 8 days ago with abdominal cramping.  At that time, STI, UPT, wet prep, and urinalysis were negative.  Was prescribed famotidine and naproxen.    Saw another provider the next day at a different clinic. Naproxen switched to meloxicam and famotidine switched to omeprazole.  Further labs obtained; CMP, amylase, lipase, TSH, CBC unremarkable.  CRP very mildly elevated.  Patient later messaged the provider and CT abdomen was ordered as symptoms were not improving; CT scheduled for 11/11.  H. pylori stool test ordered but not done yet.    Patient presents with partner today. Feeling fine right now, but did have episodes of cramping pain last  "night. Pain is better overall though. She started taking meloxicam and omeprazole three days ago and feels that this has helped pain.    Also discussed wanting to switch hydroxyzine prescription to be used for anxiety during the day in addition to insomnia - insomnia has actually been better lately. Would like to be evaluated for adult ADHD. Also thinking about getting a pap smear at some point.    Review of Systems   Pertinent positives and negatives as noted in HPI.        Objective    BP 95/61   Pulse 77   Temp 98.7  F (37.1  C) (Oral)   Resp 20   Ht 1.486 m (4' 10.5\")   Wt 42.9 kg (94 lb 9.6 oz)   LMP 10/23/2023 (Exact Date)   SpO2 97%   BMI 19.43 kg/m    Body mass index is 19.43 kg/m .  Physical Exam   GENERAL: alert and no distress  MS: no gross musculoskeletal defects noted  SKIN: no suspicious lesions or rashes  NEURO: Normal strength and tone, mentation intact and speech normal  PSYCH: mentation appears normal, affect normal      ----- Service Performed and Documented by Resident or Fellow ------  "

## 2023-11-09 NOTE — Clinical Note
Andrey Jesus and Maribel Davidson expressed interest in being evaluated for adult ADHD and I told her about how our clinic is now doing ADHD evals.  I also gave her some community resources as well.  I told her to contact our clinic if she is interested in pursuing visits and wanted to give you guys a heads up as well. Thanks!   Joanie

## 2023-11-09 NOTE — PATIENT INSTRUCTIONS
Nice to see you today!    Here's what we talked about:  - Keep taking the meloxicam and Prilosec. You can try Tylenol for headaches.  - Once you get the CT scan, follow up with Dr. Silveira.  - You can call our clinic to set up the adult ADHD visits - I will let our psychologist Dr. Jesus know that you are thinking about this.    Adult Attention Deficit Hyperactivity Disorder    [facility]  [address], [address], [place], [place], [address]  [phone]  [email or web address]    [facility]  Several Locations  [phone])  [email or web address]    [facility]  [address]  [address]  [place] [address]  [phone]  [email or web address]    [facility]  Several Locations.  [phone]  [email or web address]    [facility]  [address]  [place], [place] [id number] [phone]  [email or web address]

## 2023-11-09 NOTE — PROGRESS NOTES
Preceptor Attestation:   Patient seen, evaluated and discussed with the resident. I have verified the content of the note, which accurately reflects my assessment of the patient and the plan of care.   Supervising Physician:  Daryn Garcia MD

## 2023-11-10 PROCEDURE — 87338 HPYLORI STOOL AG IA: CPT | Performed by: FAMILY MEDICINE

## 2023-11-11 ENCOUNTER — HOSPITAL ENCOUNTER (OUTPATIENT)
Dept: CT IMAGING | Facility: HOSPITAL | Age: 23
Discharge: HOME | End: 2023-11-11
Attending: FAMILY MEDICINE | Admitting: FAMILY MEDICINE
Payer: COMMERCIAL

## 2023-11-11 DIAGNOSIS — R10.9 ABDOMINAL CRAMPING: ICD-10-CM

## 2023-11-11 PROCEDURE — 74176 CT ABD & PELVIS W/O CONTRAST: CPT

## 2023-11-13 ENCOUNTER — TELEPHONE (OUTPATIENT)
Dept: FAMILY MEDICINE | Facility: CLINIC | Age: 23
End: 2023-11-13
Payer: COMMERCIAL

## 2023-11-13 ENCOUNTER — MYC MEDICAL ADVICE (OUTPATIENT)
Dept: FAMILY MEDICINE | Facility: CLINIC | Age: 23
End: 2023-11-13
Payer: COMMERCIAL

## 2023-11-13 LAB — H PYLORI AG STL QL IA: POSITIVE

## 2023-11-13 NOTE — TELEPHONE ENCOUNTER
----- Message from Milagros Dave MD sent at 11/13/2023 10:28 AM CST -----  Regarding: Adult ADHD eval needed - 40 min  Hi Lisa,  Please schedule this patient for a 40 minute adult ADHD evaluation.  Thank you!  Dr. Dave

## 2023-11-14 ENCOUNTER — MYC MEDICAL ADVICE (OUTPATIENT)
Dept: PEDIATRICS | Facility: CLINIC | Age: 23
End: 2023-11-14
Payer: COMMERCIAL

## 2023-11-14 DIAGNOSIS — A04.8 H. PYLORI INFECTION: Primary | ICD-10-CM

## 2023-11-14 RX ORDER — LANSOPRAZOLE, AMOXICILLIN, CLARITHROMYCIN 30-500-500
KIT ORAL
Qty: 1 EACH | Refills: 0 | Status: SHIPPED | OUTPATIENT
Start: 2023-11-14 | End: 2023-11-20

## 2023-11-14 NOTE — TELEPHONE ENCOUNTER
Central Prior Authorization Team   Phone: [phone]    PA Initiation    Medication: OMEPRAZOLE 20 MG PO CPDR  Insurance Company: [employer] - Phone [phone] Fax [phone]  Pharmacy Filling the Rx: [id number] - [place], [place] - [address] AT [facility] [address]  Filling Pharmacy Phone: [phone]  Filling Pharmacy Fax:    Start Date: 11/14/2023

## 2023-11-15 NOTE — TELEPHONE ENCOUNTER
Prior Authorization Approval    Medication: OMEPRAZOLE 20 MG PO CPDR  Authorization Effective Date: 11/14/2023  Authorization Expiration Date: 12/14/2023  Approved Dose/Quantity:   Reference #:     Insurance Company: [employer] - Phone [phone] Fax [phone]  Expected CoPay: $    CoPay Card Available:      Financial Assistance Needed:   Which Pharmacy is filling the prescription: [id number] - [place], [place] - [address] AT [facility] [address]  Pharmacy Notified: Yes  Patient Notified: **Instructed pharmacy to notify patient when script is ready to /ship.**

## 2023-11-17 ENCOUNTER — OFFICE VISIT (OUTPATIENT)
Dept: FAMILY MEDICINE | Facility: CLINIC | Age: 23
End: 2023-11-17
Payer: COMMERCIAL

## 2023-11-17 VITALS
OXYGEN SATURATION: 96 % | RESPIRATION RATE: 20 BRPM | TEMPERATURE: 97.8 F | SYSTOLIC BLOOD PRESSURE: 90 MMHG | HEART RATE: 87 BPM | BODY MASS INDEX: 18.9 KG/M2 | WEIGHT: 92 LBS | DIASTOLIC BLOOD PRESSURE: 60 MMHG

## 2023-11-17 DIAGNOSIS — Z02.89 ENCOUNTER FOR COMPLETION OF FORM WITH PATIENT: Primary | ICD-10-CM

## 2023-11-17 DIAGNOSIS — R10.9 ABDOMINAL CRAMPING: ICD-10-CM

## 2023-11-17 DIAGNOSIS — A04.8 H. PYLORI INFECTION: ICD-10-CM

## 2023-11-17 PROCEDURE — 99214 OFFICE O/P EST MOD 30 MIN: CPT | Mod: GC

## 2023-11-17 ASSESSMENT — PATIENT HEALTH QUESTIONNAIRE - PHQ9: SUM OF ALL RESPONSES TO PHQ QUESTIONS 1-9: 19

## 2023-11-17 NOTE — PROGRESS NOTES
Assessment & Plan     Encounter for completion of form with patient  H. pylori infection  Abdominal cramping  Significant abdominal cramping, first evaluated for this on 11/1/2023.  CT abdomen was performed and found to be negative, H. pylori test returned positive.  Patient has prescription for triple therapy with clarithromycin, amoxicillin, lansoprazole.  Due to side effects of this treatment as well as significant abdominal cramping, completed FMLA form for incapacitation from work while undergoing triple therapy treatment.  Following this, anticipate patient may still have some symptoms so wrote for a 3-month period of intermittent stoppage of work up to 3 days/week.      Return in about 1 month (around 12/17/2023) for Follow up with your PCP to re-evaluate your abdominal pain.    Jitendra Mederos [facility]   Beth is a 23 year old, presenting for the following health issues:  Forms (Fmla forms )      11/17/2023     2:12 PM   Additional Questions   Roomed by ALEAH Watts MA   Accompanied by Self       HPI     Patient is 23-year-old female with personal medical history of MDD, ADHD, and presents for FMLA paperwork due to abdominal pain.    She was initially seen 11/1/2023 for abdominal cramping for 10 days that was moderate in intensity, and would come and go but last for multiple hours of the day.  No temporal association or association with eating.  Denies any nausea, vomiting, diarrhea, constipation, hematemesis, melena.    She describes the pain as a dull ache in the LUQ that comes and goes, dull at baseline with a sharp component for 5 to 10 minutes per episode, with episodes lasting for the day or intermittently occurring through the day.  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  She had a CT abdomen performed which was negative for acute pathology.  H. pylori that was recently performed and  positive.  She was sent a prescription for combined triple therapy which was not covered by her insurance, so she was given clarithromycin and amoxicillin and lansoprazole individually and told to take them.  She would like to confirm with the physician that these are the correct medications to take as they were not provided by a physician and instead were provided by the pharmacist.    Review of Systems   Constitutional, HEENT, cardiovascular, pulmonary, gi and gu systems are negative, except as otherwise noted.      Objective    BP 90/60   Pulse 87   Temp 97.8  F (36.6  C) (Oral)   Resp 20   Wt 41.7 kg (92 lb)   LMP 11/15/2023 (Exact Date)   SpO2 96%   BMI 18.90 kg/m    Body mass index is 18.9 kg/m .  Physical Exam   Constitutional: Awake, alert, cooperative, no apparent distress, and appears stated age.  Eyes: Lids and lashes normal, extra ocular muscles intact, sclera clear, conjunctiva normal.  ENT: Normocephalic, atraumatic. Moist mucous membranes.  GI: Abdomen soft, non-tender, non-distended, no masses palpated.   Skin: No bruising or bleeding and normal skin color, texture, turgor.  Musculoskeletal: There is no redness, warmth, or swelling of the joints.

## 2023-11-17 NOTE — PROGRESS NOTES
Preceptor Attestation:    I discussed the patient with the resident and evaluated the patient in person. I have verified the content of the note, which accurately reflects my assessment of the patient and the plan of care.   Supervising Physician:  Karson Schofield MD.

## 2023-11-20 DIAGNOSIS — A04.8 H. PYLORI INFECTION: Primary | ICD-10-CM

## 2023-11-20 RX ORDER — TETRACYCLINE HYDROCHLORIDE 500 MG/1
500 CAPSULE ORAL 4 TIMES DAILY
Qty: 56 CAPSULE | Refills: 0 | Status: SHIPPED | OUTPATIENT
Start: 2023-11-20 | End: 2023-12-04

## 2023-11-20 RX ORDER — METRONIDAZOLE 250 MG/1
250 TABLET ORAL 4 TIMES DAILY
Qty: 56 TABLET | Refills: 0 | Status: SHIPPED | OUTPATIENT
Start: 2023-11-20 | End: 2023-12-04

## 2023-11-20 RX ORDER — BISMUTH SUBSALICYLATE 262 MG/1
2 TABLET, CHEWABLE ORAL
Qty: 112 TABLET | Refills: 0 | Status: SHIPPED | OUTPATIENT
Start: 2023-11-20 | End: 2023-12-04

## 2023-11-20 NOTE — PROGRESS NOTES
Patient's insurance did not cover the first regimen that was prescribed for H. pylori treatment.  I sent in quadruple therapy to her pharmacy and sent a FonJax message back to the patient

## 2023-11-21 NOTE — TELEPHONE ENCOUNTER
Isaías Barron for the mixup.  You should take the amoxicillin, clarithromycin, and lansoprazole as directed. You do not need to take the pepto bismol. Do not take the omeprazole for the next 14 days but may resume your regular omeprazole in 2 weeks after you finish the treatment for H. pylori.    Jesusita Nieto M.D. 11/21/2023 10:42 AM

## 2023-12-08 ENCOUNTER — OFFICE VISIT (OUTPATIENT)
Dept: FAMILY MEDICINE | Facility: CLINIC | Age: 23
End: 2023-12-08
Payer: COMMERCIAL

## 2023-12-08 VITALS
BODY MASS INDEX: 19.19 KG/M2 | DIASTOLIC BLOOD PRESSURE: 69 MMHG | HEIGHT: 58 IN | TEMPERATURE: 98.6 F | WEIGHT: 91.4 LBS | RESPIRATION RATE: 20 BRPM | SYSTOLIC BLOOD PRESSURE: 102 MMHG | OXYGEN SATURATION: 97 % | HEART RATE: 93 BPM

## 2023-12-08 DIAGNOSIS — F43.10 POSTTRAUMATIC STRESS DISORDER: ICD-10-CM

## 2023-12-08 DIAGNOSIS — F90.0 ATTENTION DEFICIT HYPERACTIVITY DISORDER (ADHD), PREDOMINANTLY INATTENTIVE TYPE: Primary | ICD-10-CM

## 2023-12-08 DIAGNOSIS — F33.2 SEVERE EPISODE OF RECURRENT MAJOR DEPRESSIVE DISORDER, WITHOUT PSYCHOTIC FEATURES (H): ICD-10-CM

## 2023-12-08 PROCEDURE — 99214 OFFICE O/P EST MOD 30 MIN: CPT | Performed by: STUDENT IN AN ORGANIZED HEALTH CARE EDUCATION/TRAINING PROGRAM

## 2023-12-08 ASSESSMENT — PATIENT HEALTH QUESTIONNAIRE - PHQ9
SUM OF ALL RESPONSES TO PHQ QUESTIONS 1-9: 18
5. POOR APPETITE OR OVEREATING: MORE THAN HALF THE DAYS

## 2023-12-08 ASSESSMENT — ANXIETY QUESTIONNAIRES
GAD7 TOTAL SCORE: 12
7. FEELING AFRAID AS IF SOMETHING AWFUL MIGHT HAPPEN: SEVERAL DAYS
1. FEELING NERVOUS, ANXIOUS, OR ON EDGE: NEARLY EVERY DAY
5. BEING SO RESTLESS THAT IT IS HARD TO SIT STILL: MORE THAN HALF THE DAYS
3. WORRYING TOO MUCH ABOUT DIFFERENT THINGS: MORE THAN HALF THE DAYS
GAD7 TOTAL SCORE: 12
2. NOT BEING ABLE TO STOP OR CONTROL WORRYING: MORE THAN HALF THE DAYS
IF YOU CHECKED OFF ANY PROBLEMS ON THIS QUESTIONNAIRE, HOW DIFFICULT HAVE THESE PROBLEMS MADE IT FOR YOU TO DO YOUR WORK, TAKE CARE OF THINGS AT HOME, OR GET ALONG WITH OTHER PEOPLE: SOMEWHAT DIFFICULT
6. BECOMING EASILY ANNOYED OR IRRITABLE: NOT AT ALL

## 2023-12-08 NOTE — LETTER
[facility] [address] [place] [address]  Phone: [phone]  Fax: [phone]    December 8, 2023        Beth Real  [address] [place] [address]          To whom it may concern:    RE: Beth Real    Patient was seen and treated today at our clinic.    She requires a more sedentary set of duties at work for the next one week, primarily seated work.    When the patient returns to work, the restrictions apply until 12/15/2023.      Please contact me for questions or concerns.      Sincerely,      Milagros Dave

## 2023-12-08 NOTE — PROGRESS NOTES
[facility] Adult ADHD Assessment      History related to ADHD:  When did you start experiencing symptoms of inattention, concentration or hyperactivity? Entire life - diagnosed with ADHD a long time ago, hoping to have both ADHD and PTSD diagnoses.  Can you describe what your symptoms looked like at that time?  Couldn't red without reviewing multiple times. Bored quickly with fun things. Hard time with school work, ?dissociation, wouldn't remember school days. Hard time focusing.   Did these symptoms fluctuate or ever go away for a time?  If so, any theories about that (e.g. change in environment, stressors or treatment)?  Sometimes, on ritalin hyperfocused. Would clean one tile at a time to make sure all clean.   Looking for a history of presenting ADHD symptoms, including evidence of non-remitting symptoms that have interfered with functioning over time and evidence of symptom presentation prior to age 14  Can you tell me about your developmental history?  Any delays or problems learning to walk, talk, speak or learn?: reading was late, speaks no Danish.   Anyone in your family have a history of ADHD and/or other educational, learning, physical, or psychological difficulties?: unknown. Possible FAS contribution.   How did you do in school?  Any concerns with academic performance in elementary, secondary, and postsecondary education?: Poorly, GPA usually Cs and Ds. Fs before high school.  Were you ever diagnosed with a learning disability?  ADHD  Did you ever have any accommodations or special education services in place (IEP, 504 plan, extra tutoring or pull out services at school)?: none  How did you do on standardized tests such as the SAT, ACT, and LAST?: poorly  Did you get any accommodations on standardized tests?  If so, what where they?: no  Have you ever taken medication to treat ADHD?  If so, what was that like for you?  Did it impact symptoms?  Did you experience any side effects?   Bupropion (flat, doesn't think helpful for ADHD symptoms, often felt like couldn't do anything), ritalin (hyperfocused)  Any co-morbid mental health concerns (e.g., depression, anxiety, etc.)?  Yes  What other mental health treatments have you tried (pharmacological and non-pharmacological)? Therapy, recommended inpatient [facility] therapist, invigorate - bad MH environment at home.   Tell me about your employment history: school on pause - 2 years, 1 year worth of classes. 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 Doesn't like it. Cognitive aspects very hard. Hard to get through forms, wants to look for another job.   What do your current symptoms look like?  Hard time getting through work/focusing, hard time tuning in on what should be doing. Very bad memory issues.     Have you ever been assessed for ADHD by another medical or mental health professional?  yes  If yes, do you have those records or can you get them?  Doesn't know how. Can ask her dad for this.   Obtain MARE if patient reports prior assessment and knows where this assessment took place.  Once obtained, review of any prior neuropsychological or psychoeducational test reports, or diagnoses found in other types of documentation.    Review of appropriate screening tests: PHQ9, SHAHRZAD 7, PCL 5, Rapid Mood Screener, TAPS, ADHD Self-Report Scale   Results below    PHQ-9 score:        12/8/2023     1:42 PM   PHQ   PHQ-9 Total Score 18   Q9: Thoughts of better off dead/self-harm past 2 weeks More than half the days       SHAHRZAD 7:       8/15/2023     9:03 AM 11/1/2023    11:25 AM 12/8/2023     1:42 PM   SHAHRZAD-7 SCORE   Total Score 0 19 12         PCL: PTSD likely ruled in   Rapid Mood Screener: Score: 2, Bipolar likely ruled out   TAPS: Score: 2 positive - drinks and drugs (marijuana) - weekly for both, Pending, Other: tobacco, Substance misuse likely ruled out, and  "Substance misuse likely ruled in - vaping, some cigarettes  - quit 2 mos ago and 3 weeks ago started vaping   - binge drinking - weekly - unsure if medicating - whisky - age regression - not buying bc $  - marijuana - which has been provoking anxiety - hard time with saying no  ASRS: Score: Top score for most questions and ADHD likely     Pertinent Social Hx:  FINANCIAL SUPPORT: working and help of parents.   LIVING SITUATION / RELATIONSHIPS: sleeping at partner's (Angus's)   SOCIAL/ SPIRITUAL SUPPORT: Friends, dancing, other social supports. Also has friend living at Angus's house, his friend Armando and friends at house supportive of MH struggles. Very good friend support there.      Care Team:  Mental Health Provider/Therapist:  Mason Moss -   Psychiatrist:  None  : None  Other relevant providers involved in care:  None,     - off of medications - bupropion, lexapro -they were making her feel flat, wanting to explore what to try next for her symptoms       Pertinent Substance Use: See details above under TAPS assessment  Alcohol: Yes: Intermittent, see above  Nicotine: Yes  Caffeine: Did not assess  Opioids: No          Narcan Kit: N/A  THC/CBD: Yes, intermittent but problematic, see above  Other Illicit Drugs: No     Substance Misuse:  Treatment: N/A  Medical Consequences [withdrawal, sz etc]: N/A  Legal Consequences: N/A    ROS:  Constitutional, neuro, endocrine, gastrointestinal, genitourinary and psychiatric systems are otherwise negative.    /69   Pulse 93   Temp 98.6  F (37  C) (Oral)   Resp 20   Ht 1.469 m (4' 9.84\")   Wt 41.5 kg (91 lb 6.4 oz)   LMP 11/15/2023 (Exact Date)   SpO2 97%   BMI 19.21 kg/m     Physical Exam     Assessment:  Patient is being evaluated for possible adult ADHD.  She very likely has ADHD and received a diagnosis for this in the past but also has a significant contribution of PTSD to her symptomatology.  She is interested in restarting " some treatments for ADHD although has recently had difficulty with feeling flat on bupropion and had hyperfocus (describes scrubbing each tile on the floor individually while cleaning) on other stimulant medications.  We will complete the DIVA 5 assessment at her follow-up visit for this problem.  In the meanwhile she is having significant depressive symptoms and has stopped her antidepressants.  Due to constraints of time and the ability of her partner to help with safety we will meet back on Tuesday next week to make a plan for medication.  We will likely trial also a nonstimulant medication for ADHD such as Strattera.      Plan:  Patient will follow up with an adult who knows them well (spouse, parent, close friend) to provide corroborative information and will complete the DIVA 5 at the next visit.  Obtain and review pertinent medical records associated with past diagnosis of ADHD and or learning prior to follow up visit if possible.

## 2023-12-08 NOTE — PATIENT INSTRUCTIONS
Fax for clinic: [phone]    I'll see you Tuesday at 8:40 - you should get a call to confirm  Bring in records if you get them.   If you can make a list of mental health medications you've taken for ADHD and depression that would be helpful.

## 2023-12-11 ENCOUNTER — TELEPHONE (OUTPATIENT)
Dept: FAMILY MEDICINE | Facility: CLINIC | Age: 23
End: 2023-12-11
Payer: COMMERCIAL

## 2023-12-11 PROBLEM — F43.10 POSTTRAUMATIC STRESS DISORDER: Status: ACTIVE | Noted: 2023-12-11

## 2023-12-11 NOTE — TELEPHONE ENCOUNTER
[facility] to see if pt could come at 11:30 AM tomorrow instead of 8:30 AM to see Dr Dave?/Corrine Gasca RN BSN

## 2023-12-12 ENCOUNTER — OFFICE VISIT (OUTPATIENT)
Dept: FAMILY MEDICINE | Facility: CLINIC | Age: 23
End: 2023-12-12
Payer: COMMERCIAL

## 2023-12-12 VITALS
BODY MASS INDEX: 19.65 KG/M2 | HEIGHT: 58 IN | WEIGHT: 93.6 LBS | HEART RATE: 95 BPM | OXYGEN SATURATION: 96 % | DIASTOLIC BLOOD PRESSURE: 65 MMHG | RESPIRATION RATE: 20 BRPM | SYSTOLIC BLOOD PRESSURE: 96 MMHG | TEMPERATURE: 98.1 F

## 2023-12-12 DIAGNOSIS — F33.42 MAJOR DEPRESSIVE DISORDER, RECURRENT EPISODE, IN FULL REMISSION (H): Primary | ICD-10-CM

## 2023-12-12 DIAGNOSIS — F90.0 ATTENTION DEFICIT HYPERACTIVITY DISORDER (ADHD), PREDOMINANTLY INATTENTIVE TYPE: ICD-10-CM

## 2023-12-12 DIAGNOSIS — R10.12 LUQ ABDOMINAL PAIN: ICD-10-CM

## 2023-12-12 DIAGNOSIS — F33.42 MAJOR DEPRESSIVE DISORDER, RECURRENT EPISODE, IN FULL REMISSION (H): ICD-10-CM

## 2023-12-12 PROCEDURE — 99214 OFFICE O/P EST MOD 30 MIN: CPT | Performed by: STUDENT IN AN ORGANIZED HEALTH CARE EDUCATION/TRAINING PROGRAM

## 2023-12-12 RX ORDER — DEXTROAMPHETAMINE SACCHARATE, AMPHETAMINE ASPARTATE MONOHYDRATE, DEXTROAMPHETAMINE SULFATE AND AMPHETAMINE SULFATE 1.25; 1.25; 1.25; 1.25 MG/1; MG/1; MG/1; MG/1
5 CAPSULE, EXTENDED RELEASE ORAL DAILY
Qty: 30 CAPSULE | Refills: 0 | Status: SHIPPED | OUTPATIENT
Start: 2023-12-12 | End: 2024-01-16

## 2023-12-12 RX ORDER — MIRTAZAPINE 30 MG/1
30 TABLET, FILM COATED ORAL AT BEDTIME
Qty: 30 TABLET | Refills: 0 | Status: SHIPPED | OUTPATIENT
Start: 2023-12-26 | End: 2023-12-26

## 2023-12-12 RX ORDER — MIRTAZAPINE 15 MG/1
TABLET, FILM COATED ORAL
Qty: 42 TABLET | Refills: 0 | Status: SHIPPED | OUTPATIENT
Start: 2023-12-12 | End: 2023-12-12

## 2023-12-12 RX ORDER — FAMOTIDINE 20 MG/1
20 TABLET, FILM COATED ORAL 2 TIMES DAILY PRN
Qty: 28 TABLET | Refills: 0 | Status: SHIPPED | OUTPATIENT
Start: 2023-12-26 | End: 2024-01-09

## 2023-12-12 RX ORDER — MIRTAZAPINE 15 MG/1
15 TABLET, FILM COATED ORAL AT BEDTIME
Qty: 14 TABLET | Refills: 0 | Status: SHIPPED | OUTPATIENT
Start: 2023-12-12 | End: 2024-01-16

## 2023-12-12 ASSESSMENT — PATIENT HEALTH QUESTIONNAIRE - PHQ9: SUM OF ALL RESPONSES TO PHQ QUESTIONS 1-9: 19

## 2023-12-12 NOTE — PROGRESS NOTES
Chief Complaint   Patient presents with    RECHECK     Med for depression and ADHD     h pylori       There are no exam notes on file for this visit.        Assessment/Plan:  Beth Real is a 23 year old female here for Follow-up major depressive disorder, with co-occurring posttraumatic stress disorder and likely ADHD.  She has been having insufficient response to the combination of escitalopram with bupropion for her depressive symptoms over the past several months and describes ongoing blunted emotions and difficulty appreciating joyful times. However, this seems likely longstanding struggle.  Two areas she has chronically struggled with include insomnia and decreased appetite.  Her PHQ-9 score today was 19.  She has seen some improved sleep over the past couple of weeks while really focusing on sleep hygiene.  Given these 2 areas that may be addressed with medication therapy we discussed either a trial of venlafaxine versus mirtazapine.  Given the side effect profile mirtazapine was selected as her agent of choice trial.  We will initiate a trial with 15 mg of mirtazapine daily for 2 weeks followed by 30 mg daily.  We discussed taking the medication 1 hour before she desires to be asleep.    We also had a discussion about stimulant versus nonstimulant medications for ADHD and opted to trial of low-dose Adderall XR 5 mg daily.  She previously experienced symptoms of hyperfocus but upon further discussion it appears that this was with quite high doses of candidate so we will trial a lower dose of extended release medication.    We will follow-up in 2 weeks to review her response.  If she has oversedation it may be beneficial to increase to 30 mg of mirtazapine given that it tends to be less sedating at a higher dose.  We could consider switching to Effexor if she does not respond well to a trial of mirtazapine given her underlying PTSD.  We also discussed having a very low threshold to refer her to psychiatry  "for assistance with medication management, including possibly seeing Dr. Traore    Beth was seen today for recheck and h pylori.    Diagnoses and all orders for this visit:    Major depressive disorder, recurrent episode, in full remission (H24)  -     mirtazapine (REMERON) 15 MG tablet; Take 1 tablet (15 mg) by mouth at bedtime for 14 days, THEN 2 tablets (30 mg) at bedtime for 14 days.    LUQ abdominal pain: She did not complete quadruple therapy as prescribed, taking 2 tablets 2 times daily as opposed to 1 tablet 4 times daily for medications prescribed 4 times daily.  She has ongoing abdominal symptoms but only finished treatment 1 week ago.  I will put her back on omeprazole at this time, to take 1 capsule 2 times a day.  In 2 weeks she will transition to famotidine 2 times daily until she can retest for H. pylori around January 1.  -     omeprazole (PRILOSEC) 20 MG DR capsule; Take 1 capsule (20 mg) by mouth 2 times daily for 14 days  -     famotidine (PEPCID) 20 MG tablet; Take 1 tablet (20 mg) by mouth 2 times daily as needed (reflux) Start by 12/26  -     Helicobacter pylori Antigen Stool; Future  -     Helicobacter pylori Antigen Stool    Attention deficit hyperactivity disorder (ADHD), predominantly inattentive type  -     amphetamine-dextroamphetamine (ADDERALL XR) 5 MG 24 hr capsule; Take 1 capsule (5 mg) by mouth daily for 30 days Contact Dr. Dave if 5mg provided insufficient effect.        Follow-up with Milagros Dave in 2w for review medication changes.    No future appointments.      Milagros Dave MD      There are no Patient Instructions on file for this visit.    Subjective:  Beth Real is a 23 year old female here for follow up for mental health.  Had been feeling pretty numb on her meds.  Tends to go day to day without noticing how she feels.  Started taking wellbutrin and and lexapro the past two days.    Has always been \"spacey\" - would do dissociation for a long time. "   Doesn't notice a difference in numbness on vs off meds.  Hard time remembering joyful moments        11/17/2023     2:13 PM 12/8/2023     1:42 PM 12/12/2023    11:49 AM   PHQ   PHQ-9 Total Score 19 18 19   Q9: Thoughts of better off dead/self-harm past 2 weeks Not at all More than half the days More than half the days   Self harm - used to cut.   Doesn't think would act on thoughts.   Thinks it would be worse at her own house.   No serious thoughts about suicide.    Thinks OK at her partner's house. In a supportive environment.   Would not want to be inpatient but maybe partial inpatient.  Has been eating 3 meals/day right now.   Will forget to eat some of the time,     Sleep: Sleep is hard, difficult time with sleeping, up a lot at nighttime. Sleeps   No caffeine after 2 pm. Trying to do sleep hygiene, going to bed 12-2 pm.   Then sleeps.     Poor appetite, picky eater, past of eating disorder.   Often groggy during the day. Less groggy around others.     Concentration poor.    Asking also when she should have her first Pap smear.  We discussed that this is something to complete but for the moment we will focus on stabilizing mental health and ADHD symptoms and we will try to complete her Pap smear within the year.  She is currently sexually active and uses condoms, no other form of birth control is currently being used.  We discussed that the O pill is available over-the-counter if she wants to avoid having a prescription for birth control.    Patient Active Problem List   Diagnosis    GHD (growth hormone deficiency) (H24)    Short stature    Esotropia    Diplopia    Myopia with astigmatism    Attention deficit hyperactivity disorder (ADHD), predominantly inattentive type    Acute pain of right knee    Major depression, recurrent (H24)    Posttraumatic stress disorder       Current Outpatient Medications   Medication    amphetamine-dextroamphetamine (ADDERALL XR) 5 MG 24 hr capsule    [START ON 12/26/2023]  "famotidine (PEPCID) 20 MG tablet    ferrous sulfate (FEROSUL) 325 (65 Fe) MG tablet    hydrOXYzine (ATARAX) 10 MG tablet    mirtazapine (REMERON) 15 MG tablet    omeprazole (PRILOSEC) 20 MG DR capsule     No current facility-administered medications for this visit.       Objective:  BP 96/65   Pulse 95   Temp 98.1  F (36.7  C) (Oral)   Resp 20   Ht 1.473 m (4' 10\")   Wt 42.5 kg (93 lb 9.6 oz)   LMP 11/15/2023 (Exact Date)   SpO2 96%   BMI 19.56 kg/m    Body mass index is 19.56 kg/m .  Gen: A/O x3, in NAD.  Bright and bubbly.  Cardio: Breathing comfortably in no apparent distress.  Neuro: Grossly intact.    "

## 2023-12-21 ENCOUNTER — MYC MEDICAL ADVICE (OUTPATIENT)
Dept: FAMILY MEDICINE | Facility: CLINIC | Age: 23
End: 2023-12-21

## 2023-12-21 DIAGNOSIS — F90.0 ATTENTION DEFICIT HYPERACTIVITY DISORDER (ADHD), PREDOMINANTLY INATTENTIVE TYPE: Primary | ICD-10-CM

## 2023-12-26 ENCOUNTER — MYC REFILL (OUTPATIENT)
Dept: FAMILY MEDICINE | Facility: CLINIC | Age: 23
End: 2023-12-26
Payer: COMMERCIAL

## 2023-12-26 DIAGNOSIS — F33.42 MAJOR DEPRESSIVE DISORDER, RECURRENT EPISODE, IN FULL REMISSION (H): ICD-10-CM

## 2023-12-27 RX ORDER — MIRTAZAPINE 30 MG/1
30 TABLET, FILM COATED ORAL AT BEDTIME
Qty: 30 TABLET | Refills: 0 | Status: SHIPPED | OUTPATIENT
Start: 2023-12-27 | End: 2024-01-20

## 2023-12-27 NOTE — TELEPHONE ENCOUNTER
Routing refill request to provider for review/approval because:   Patient has PHQ-9 score less than 5 in past 6 months.     Medication requested: mirtazapine (REMERON) 30 MG tablet   Last office visit: 12/12/2023  Future [facility] appointments: 01/10/2024  Medication last refilled: Last ordered: 2 weeks ago (12/12/2023) by Milagros Dave MD   Last qualifying labs: none    Routed to provider due to failed RN protocol.     ODILON Soler

## 2024-01-16 ENCOUNTER — OFFICE VISIT (OUTPATIENT)
Dept: FAMILY MEDICINE | Facility: CLINIC | Age: 24
End: 2024-01-16
Payer: COMMERCIAL

## 2024-01-16 VITALS
DIASTOLIC BLOOD PRESSURE: 72 MMHG | BODY MASS INDEX: 20.24 KG/M2 | OXYGEN SATURATION: 97 % | RESPIRATION RATE: 16 BRPM | WEIGHT: 96.4 LBS | SYSTOLIC BLOOD PRESSURE: 104 MMHG | HEART RATE: 83 BPM | TEMPERATURE: 98.7 F | HEIGHT: 58 IN

## 2024-01-16 DIAGNOSIS — F90.0 ATTENTION DEFICIT HYPERACTIVITY DISORDER (ADHD), PREDOMINANTLY INATTENTIVE TYPE: ICD-10-CM

## 2024-01-16 DIAGNOSIS — F43.10 POSTTRAUMATIC STRESS DISORDER: ICD-10-CM

## 2024-01-16 DIAGNOSIS — F33.2 SEVERE EPISODE OF RECURRENT MAJOR DEPRESSIVE DISORDER, WITHOUT PSYCHOTIC FEATURES (H): Primary | ICD-10-CM

## 2024-01-16 DIAGNOSIS — A04.8 H. PYLORI INFECTION: ICD-10-CM

## 2024-01-16 PROCEDURE — 99214 OFFICE O/P EST MOD 30 MIN: CPT | Mod: GC

## 2024-01-16 RX ORDER — AMOXICILLIN 500 MG/1
CAPSULE ORAL
COMMUNITY
Start: 2023-11-16 | End: 2024-04-23

## 2024-01-16 ASSESSMENT — ANXIETY QUESTIONNAIRES
5. BEING SO RESTLESS THAT IT IS HARD TO SIT STILL: SEVERAL DAYS
1. FEELING NERVOUS, ANXIOUS, OR ON EDGE: MORE THAN HALF THE DAYS
6. BECOMING EASILY ANNOYED OR IRRITABLE: NOT AT ALL
2. NOT BEING ABLE TO STOP OR CONTROL WORRYING: MORE THAN HALF THE DAYS
3. WORRYING TOO MUCH ABOUT DIFFERENT THINGS: MORE THAN HALF THE DAYS
GAD7 TOTAL SCORE: 9
GAD7 TOTAL SCORE: 9
7. FEELING AFRAID AS IF SOMETHING AWFUL MIGHT HAPPEN: SEVERAL DAYS
IF YOU CHECKED OFF ANY PROBLEMS ON THIS QUESTIONNAIRE, HOW DIFFICULT HAVE THESE PROBLEMS MADE IT FOR YOU TO DO YOUR WORK, TAKE CARE OF THINGS AT HOME, OR GET ALONG WITH OTHER PEOPLE: SOMEWHAT DIFFICULT

## 2024-01-16 ASSESSMENT — PATIENT HEALTH QUESTIONNAIRE - PHQ9
SUM OF ALL RESPONSES TO PHQ QUESTIONS 1-9: 13
5. POOR APPETITE OR OVEREATING: SEVERAL DAYS

## 2024-01-16 NOTE — PROGRESS NOTES
Assessment & Plan     Severe episode of recurrent major depressive disorder, without psychotic features (H)  Patient has had improvement of her depressive symptoms on mirtazapine and likes the dosage that she is currently on. Given improvement, we will continue mirtazapine at the same dose. Discussed taking her daily dose around dinner time to hopefully improve the increased grogginess/fatigue in the morning. She should follow up in about a month for a continued check-in  - Continue mirtazapine (REMERON) 30 MG tablet; Take 1 tablet (30 mg) by mouth at bedtime    Attention deficit hyperactivity disorder (ADHD), predominantly inattentive type  Patient reported that the Adderrall the current dosage is working well to manage her ADHD symptoms with minimal side effects of hyper-focusing. As she feels well controlled, continuing at the same dosage is appropriate.   - Continue amphetamine-dextroamphetamine (ADDERALL XR) 5 MG 24 hr capsule; Take 1 capsule (5 mg) by mouth daily for 30 days     Posttraumatic stress disorder  Patient reported doing weekly therapy which is helping manage her symptoms and is on the wait list outpatient program for additional help with managing her PTSD. As she feels this is well controlled on our visit today, no changes in new measures need to be taken at this time.     H. pylori infection  Patient completed quadruple therapy though took dosages at different times (see previous clinic note) and has no had recurrence of symptoms. She has not returned her H. Pylor stool antigen test yet. Counseled that she should stop taking any PPI or H2 blockers for about 2 weeks and then bring the stool in for testing for confirm eradication.   - Helicobacter pylori Antigen Stool   - Stop omeprazole and famotidine for 2 weeks prior to stool testing    Follow up in 4 weeks for follow up on all these conditions.    Chandana Moran, MS3  [facility]    Resident/Fellow Attestation   ISharri  MD Fredrick, was present with the medical/JASPAL student who participated in the service and in the documentation of the note.  I have verified the history and personally performed the physical exam and medical decision making.  I agree with the assessment and plan of care as documented in the note.  Additions and edits have been made in blue.    Sharri Alcala MD, PGY3  [facility]      Subjective   Beth is a 23 year old, presenting for the following health issues:  Follow Up (Follow up meidcation)        1/16/2024     9:48 AM   Additional Questions   Roomed by msy   Accompanied by self       HPI   Beth is a 23 year old female with history of MDD, PTSD, ADHD and H. Pylori infection who presents to clinic for follow up on medications. At patient's last visit here on 12/12/23, she was prescribed mirtazapine and Adderall for her mental health conditions.     She states the mirtazapine has definitely improved her depressive symptoms and she has felt an increase in her overall mood with this medication. She does experience a heavier sleep w/ increased fatigue/grogginess in the morning. She usually takes this medication prior to going to bed. She likes the dosage that she is on currently and does not think it would be beneficial to change the medication type or dosage as it is improving her depressive symptoms.     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 She thinks this medication is working well and controlling her ADHD well. She does experience some hyper-focusing symptoms, but states this is not nearly as severe as what she was experiencing previously on Ritalin. She thinks the current dosage of Adderall is adequate for her ADHD.    She does report continued difficulty with her PTSD thought states she sees a  "therapist whom she likes weekly and this helps with managing this. She additionally mentioned that she is on the wait list for an outpatient treatment program regarding her PTSD and hopes to do this in the next few months. She states the program would be approximately 6 hours per day for 5 days per week. She is looking forward to being able to take part in this program.    Regarding her h. Pylori infection, she has not had any recurrence of symptoms and denies any abdominal pain or dramatic change in stools. She is no longer taking the omeprazole but takes the famotidine daily. She has not done the stool test for eradication yet.     Review of Systems   Denies any chest pain, shortness of breath, palpitations, headache, vision changes or other symptoms.        Objective    /72   Pulse 83   Temp 98.7  F (37.1  C) (Oral)   Resp 16   Ht 1.469 m (4' 9.84\")   Wt 43.7 kg (96 lb 6.4 oz)   LMP 11/15/2023 (Exact Date)   SpO2 97%   BMI 20.26 kg/m    Body mass index is 20.26 kg/m .    Physical Exam   GENERAL: healthy, alert and no distress  LUNGS: speaking in full sentences, no cough, no wheezing  PSYCH: mentation appears normal, affect normal/bright, making eye contact, good conversation    ----- Services Performed by a MEDICAL STUDENT in Presence of RESIDENT/FELLOW Physician-------      "

## 2024-01-16 NOTE — PROGRESS NOTES
Preceptor Attestation:    I discussed the patient with the resident & medical student and evaluated the patient in person. I have verified the content of the note, which accurately reflects my assessment of the patient and the plan of care.   Supervising Physician:  Edgar Aburto MD.

## 2024-01-20 ENCOUNTER — MYC REFILL (OUTPATIENT)
Dept: FAMILY MEDICINE | Facility: CLINIC | Age: 24
End: 2024-01-20
Payer: COMMERCIAL

## 2024-01-20 DIAGNOSIS — F33.42 MAJOR DEPRESSIVE DISORDER, RECURRENT EPISODE, IN FULL REMISSION (H): ICD-10-CM

## 2024-01-22 RX ORDER — MIRTAZAPINE 30 MG/1
30 TABLET, FILM COATED ORAL AT BEDTIME
Qty: 90 TABLET | Refills: 0 | Status: SHIPPED | OUTPATIENT
Start: 2024-01-22 | End: 2024-03-30

## 2024-01-22 RX ORDER — DEXTROAMPHETAMINE SACCHARATE, AMPHETAMINE ASPARTATE MONOHYDRATE, DEXTROAMPHETAMINE SULFATE AND AMPHETAMINE SULFATE 2.5; 2.5; 2.5; 2.5 MG/1; MG/1; MG/1; MG/1
10 CAPSULE, EXTENDED RELEASE ORAL DAILY
Qty: 30 CAPSULE | Refills: 0 | Status: SHIPPED | OUTPATIENT
Start: 2024-01-22 | End: 2024-03-30

## 2024-01-22 NOTE — TELEPHONE ENCOUNTER
Medication requested: mirtazapine (REMERON) 30 MG tablet   Last office visit: 1/16/24  Future [facility] appointments: 2/13/24  Medication last refilled: 12/27/23  Last qualifying labs: none    Routing refill request to provider for review/approval because:  Atypical Antidepressants Protocol [id number]/20/2024 01:35 PM   Protocol Details Patient has PHQ-9 score less than 5 in past 6 months.     ODILON Morrow, BSN

## 2024-01-29 ENCOUNTER — MYC REFILL (OUTPATIENT)
Dept: FAMILY MEDICINE | Facility: CLINIC | Age: 24
End: 2024-01-29
Payer: COMMERCIAL

## 2024-01-29 DIAGNOSIS — D50.8 IRON DEFICIENCY ANEMIA SECONDARY TO INADEQUATE DIETARY IRON INTAKE: ICD-10-CM

## 2024-01-30 RX ORDER — FERROUS SULFATE 325(65) MG
325 TABLET ORAL EVERY OTHER DAY
Qty: 45 TABLET | Refills: 1 | Status: SHIPPED | OUTPATIENT
Start: 2024-01-30 | End: 2024-03-30

## 2024-01-30 NOTE — TELEPHONE ENCOUNTER
Medication requested:    ferrous sulfate (FEROSUL) 325 (65 Fe) MG tablet     Last office visit: 1/16/24  Future [facility] appointments: none  Medication last refilled: 8/16/23  Last qualifying labs:   Component      Latest Ref Rng 11/2/2023  11:00 AM   Hemoglobin      11.7 - 15.7 g/dL 11.6 (L)         Prescription approved per [facility] Refill Protocol.  Iron Supplements [id number]/29/2024 10:18 PM   Protocol Details Patient is 12 years of age or older    Hgb OR Hct on record within the past 12 mos.    Medication is active on med list    Medication indicated for associated diagnosis    Recent (12 mo) or future (90 days) visit within the authorizing provider's specialty     Cristhian RN, BSN

## 2024-02-04 ENCOUNTER — HEALTH MAINTENANCE LETTER (OUTPATIENT)
Age: 24
End: 2024-02-04

## 2024-03-24 ENCOUNTER — TRANSFERRED RECORDS (OUTPATIENT)
Dept: MULTI SPECIALTY CLINIC | Facility: CLINIC | Age: 24
End: 2024-03-24

## 2024-03-24 LAB — PAP SMEAR - HIM PATIENT REPORTED: NORMAL

## 2024-03-30 ENCOUNTER — MYC REFILL (OUTPATIENT)
Dept: FAMILY MEDICINE | Facility: CLINIC | Age: 24
End: 2024-03-30
Payer: COMMERCIAL

## 2024-03-30 DIAGNOSIS — F33.42 MAJOR DEPRESSIVE DISORDER, RECURRENT EPISODE, IN FULL REMISSION (H): ICD-10-CM

## 2024-03-30 DIAGNOSIS — F90.0 ATTENTION DEFICIT HYPERACTIVITY DISORDER (ADHD), PREDOMINANTLY INATTENTIVE TYPE: ICD-10-CM

## 2024-04-02 RX ORDER — DEXTROAMPHETAMINE SACCHARATE, AMPHETAMINE ASPARTATE MONOHYDRATE, DEXTROAMPHETAMINE SULFATE AND AMPHETAMINE SULFATE 2.5; 2.5; 2.5; 2.5 MG/1; MG/1; MG/1; MG/1
10 CAPSULE, EXTENDED RELEASE ORAL DAILY
Qty: 30 CAPSULE | Refills: 0 | Status: SHIPPED | OUTPATIENT
Start: 2024-04-02 | End: 2024-04-23

## 2024-04-02 RX ORDER — MIRTAZAPINE 30 MG/1
30 TABLET, FILM COATED ORAL AT BEDTIME
Qty: 90 TABLET | Refills: 0 | Status: SHIPPED | OUTPATIENT
Start: 2024-04-02 | End: 2024-06-04

## 2024-04-23 ENCOUNTER — OFFICE VISIT (OUTPATIENT)
Dept: FAMILY MEDICINE | Facility: CLINIC | Age: 24
End: 2024-04-23
Payer: COMMERCIAL

## 2024-04-23 VITALS
SYSTOLIC BLOOD PRESSURE: 94 MMHG | DIASTOLIC BLOOD PRESSURE: 60 MMHG | OXYGEN SATURATION: 95 % | HEART RATE: 85 BPM | HEIGHT: 57 IN | BODY MASS INDEX: 21.49 KG/M2 | WEIGHT: 99.6 LBS | TEMPERATURE: 97.8 F | RESPIRATION RATE: 18 BRPM

## 2024-04-23 DIAGNOSIS — F33.42 MAJOR DEPRESSIVE DISORDER, RECURRENT EPISODE, IN FULL REMISSION (H): ICD-10-CM

## 2024-04-23 DIAGNOSIS — F90.0 ATTENTION DEFICIT HYPERACTIVITY DISORDER (ADHD), PREDOMINANTLY INATTENTIVE TYPE: Primary | ICD-10-CM

## 2024-04-23 DIAGNOSIS — D50.8 IRON DEFICIENCY ANEMIA SECONDARY TO INADEQUATE DIETARY IRON INTAKE: ICD-10-CM

## 2024-04-23 DIAGNOSIS — A04.8 H. PYLORI INFECTION: ICD-10-CM

## 2024-04-23 PROCEDURE — 90471 IMMUNIZATION ADMIN: CPT

## 2024-04-23 PROCEDURE — 90677 PCV20 VACCINE IM: CPT

## 2024-04-23 PROCEDURE — 99214 OFFICE O/P EST MOD 30 MIN: CPT | Mod: 25

## 2024-04-23 PROCEDURE — 90480 ADMN SARSCOV2 VAC 1/ONLY CMP: CPT

## 2024-04-23 PROCEDURE — 91320 SARSCV2 VAC 30MCG TRS-SUC IM: CPT

## 2024-04-23 RX ORDER — DEXTROAMPHETAMINE SACCHARATE, AMPHETAMINE ASPARTATE MONOHYDRATE, DEXTROAMPHETAMINE SULFATE AND AMPHETAMINE SULFATE 2.5; 2.5; 2.5; 2.5 MG/1; MG/1; MG/1; MG/1
10 CAPSULE, EXTENDED RELEASE ORAL DAILY
Qty: 30 CAPSULE | Refills: 0 | Status: SHIPPED | OUTPATIENT
Start: 2024-04-23 | End: 2024-06-04

## 2024-04-23 ASSESSMENT — PATIENT HEALTH QUESTIONNAIRE - PHQ9
10. IF YOU CHECKED OFF ANY PROBLEMS, HOW DIFFICULT HAVE THESE PROBLEMS MADE IT FOR YOU TO DO YOUR WORK, TAKE CARE OF THINGS AT HOME, OR GET ALONG WITH OTHER PEOPLE: NOT DIFFICULT AT ALL
SUM OF ALL RESPONSES TO PHQ QUESTIONS 1-9: 1
SUM OF ALL RESPONSES TO PHQ QUESTIONS 1-9: 1

## 2024-04-23 NOTE — PROGRESS NOTES
Assessment & Plan       1. Attention deficit hyperactivity disorder (ADHD), predominantly inattentive type  Follow-up.  Recent increase from 5 mg to 10 mg.  ADHD well-controlled at this dose.  Not in therapy for this currently, completed therapy program for PTSD/MDD.  No significant side effects.  Tolerating well.  - amphetamine-dextroamphetamine (ADDERALL XR) 10 MG 24 hr capsule; Take 1 capsule (10 mg) by mouth daily  Dispense: 30 capsule; Refill: 0  -If at next visit continues to be stable at this dose we will space out visits to 3 months at a time    2. Major depressive disorder, recurrent episode, in full remission (H24)  Follow-up, patient is new to me.  Improvement of depression.  No concerns with current dose of mirtazapine.  Finished outpatient therapy program and doing very well with this.  -Continue mirtazapine 30 mg at bedtime    3. Iron deficiency anemia secondary to inadequate dietary iron intake  Has been inconsistent with iron supplementation, reports that she has enough medication at this time.  No constipation.  -Patient plans to restart this, discussed she can take this every other day or daily if this is easier to remember    4. H. pylori infection  Reports she completed completed quadruple therapy and forgot to send in stool sample for retesting.  Has not been on any medication for this such as no PPI, has kit at home.  Asymptomatic.  -Plans to send in For retesting        Future visits: Discussed that she is due for her yearly preventative visit with Pap exam.  Discussed what to expect from Pap exam.      Subjective   Beth is a 23 year old, presenting for the following health issues:  RECHECK (MEDS)      4/23/2024     9:03 AM   Additional Questions   Roomed by CHRYSTAL   Accompanied by SELF         4/23/2024    Information    services provided? No     HPI     ADHD: good dose, no big change to appetite     Sleep: Improved, likes remeron, no SE.         Objective    BP  "94/60 (BP Location: Left arm, Patient Position: Sitting, Cuff Size: Adult Regular)   Pulse 85   Temp 97.8  F (36.6  C) (Oral)   Resp 18   Ht 1.46 m (4' 9.48\")   Wt 45.2 kg (99 lb 9.6 oz)   LMP 04/09/2024 (Exact Date)   SpO2 95%   BMI 21.19 kg/m    Body mass index is 21.19 kg/m .  Physical Exam   Constitutional:       General: No acute distress.     Appearance: Normal appearance. Patient not ill-appearing or diaphoretic.   Eyes:      Extraocular Movements: Extraocular movements intact.      Conjunctiva/sclera: Conjunctivae normal.   Pulmonary:      Effort: Pulmonary effort is normal. No respiratory distress.   Neurological:      General: No focal deficit present.      Comments: No gross deficits appreciated   Psychiatric:         Attention and Perception: Attention normal.         Mood and Affect: Mood normal.         Speech: Speech normal.         Behavior: Behavior normal.           Signed Electronically by: Toma Melendez MD    Answers submitted by the patient for this visit:  Patient Health Questionnaire (Submitted on 4/23/2024)  If you checked off any problems, how difficult have these problems made it for you to do your work, take care of things at home, or get along with other people?: Not difficult at all  PHQ9 TOTAL SCORE: 1    "

## 2024-04-23 NOTE — PROGRESS NOTES
Preceptor Attestation:    I discussed the patient with the resident and evaluated the patient in person. I have verified the content of the note, which accurately reflects my assessment of the patient and the plan of care.   Supervising Physician:  Uri Estrada MD.

## 2024-04-23 NOTE — NURSING NOTE
Prior to immunization administration, verified patients identity using patient s name and date of birth. Please see Immunization Activity for additional information.     Screening Questionnaire for Adult Immunization    Are you sick today?   No   Do you have allergies to medications, food, a vaccine component or latex?   No   Have you ever had a serious reaction after receiving a vaccination?   No   Do you have a long-term health problem with heart, lung, kidney, or metabolic disease (e.g., diabetes), asthma, a blood disorder, no spleen, complement component deficiency, a cochlear implant, or a spinal fluid leak?  Are you on long-term aspirin therapy?   No   Do you have cancer, leukemia, HIV/AIDS, or any other immune system problem?   No   Do you have a parent, brother, or sister with an immune system problem?   Don't Know   In the past 3 months, have you taken medications that affect  your immune system, such as prednisone, other steroids, or anticancer drugs; drugs for the treatment of rheumatoid arthritis, Crohn s disease, or psoriasis; or have you had radiation treatments?   No   Have you had a seizure, or a brain or other nervous system problem?   No   During the past year, have you received a transfusion of blood or blood    products, or been given immune (gamma) globulin or antiviral drug?   No   For women: Are you pregnant or is there a chance you could become       pregnant during the next month?   No   Have you received any vaccinations in the past 4 weeks?   No     Immunization questionnaire answers were all negative.      Patient instructed to remain in clinic for 15 minutes afterwards, and to report any adverse reactions.     Screening performed by Ailyn Seay MA on 4/23/2024 at 9:32 AM.

## 2024-06-04 ENCOUNTER — MYC REFILL (OUTPATIENT)
Dept: FAMILY MEDICINE | Facility: CLINIC | Age: 24
End: 2024-06-04
Payer: COMMERCIAL

## 2024-06-04 DIAGNOSIS — F90.0 ATTENTION DEFICIT HYPERACTIVITY DISORDER (ADHD), PREDOMINANTLY INATTENTIVE TYPE: ICD-10-CM

## 2024-06-05 RX ORDER — DEXTROAMPHETAMINE SACCHARATE, AMPHETAMINE ASPARTATE MONOHYDRATE, DEXTROAMPHETAMINE SULFATE AND AMPHETAMINE SULFATE 2.5; 2.5; 2.5; 2.5 MG/1; MG/1; MG/1; MG/1
10 CAPSULE, EXTENDED RELEASE ORAL DAILY
Qty: 30 CAPSULE | Refills: 0 | Status: SHIPPED | OUTPATIENT
Start: 2024-06-05 | End: 2024-08-09

## 2024-08-09 ENCOUNTER — OFFICE VISIT (OUTPATIENT)
Dept: FAMILY MEDICINE | Facility: CLINIC | Age: 24
End: 2024-08-09
Payer: COMMERCIAL

## 2024-08-09 VITALS
RESPIRATION RATE: 18 BRPM | DIASTOLIC BLOOD PRESSURE: 64 MMHG | TEMPERATURE: 97.7 F | BODY MASS INDEX: 20.61 KG/M2 | SYSTOLIC BLOOD PRESSURE: 103 MMHG | OXYGEN SATURATION: 97 % | HEIGHT: 58 IN | HEART RATE: 79 BPM | WEIGHT: 98.2 LBS

## 2024-08-09 DIAGNOSIS — F90.0 ATTENTION DEFICIT HYPERACTIVITY DISORDER (ADHD), PREDOMINANTLY INATTENTIVE TYPE: ICD-10-CM

## 2024-08-09 DIAGNOSIS — F33.42 MAJOR DEPRESSIVE DISORDER, RECURRENT EPISODE, IN FULL REMISSION (H): ICD-10-CM

## 2024-08-09 PROCEDURE — 99214 OFFICE O/P EST MOD 30 MIN: CPT | Mod: GC

## 2024-08-09 RX ORDER — DEXTROAMPHETAMINE SACCHARATE, AMPHETAMINE ASPARTATE MONOHYDRATE, DEXTROAMPHETAMINE SULFATE AND AMPHETAMINE SULFATE 2.5; 2.5; 2.5; 2.5 MG/1; MG/1; MG/1; MG/1
10 CAPSULE, EXTENDED RELEASE ORAL DAILY
Qty: 30 CAPSULE | Refills: 0 | Status: SHIPPED | OUTPATIENT
Start: 2024-08-09 | End: 2024-09-19

## 2024-08-09 RX ORDER — MIRTAZAPINE 30 MG/1
30 TABLET, FILM COATED ORAL AT BEDTIME
Qty: 90 TABLET | Refills: 0 | Status: SHIPPED | OUTPATIENT
Start: 2024-08-09

## 2024-08-09 ASSESSMENT — ANXIETY QUESTIONNAIRES
6. BECOMING EASILY ANNOYED OR IRRITABLE: NOT AT ALL
IF YOU CHECKED OFF ANY PROBLEMS ON THIS QUESTIONNAIRE, HOW DIFFICULT HAVE THESE PROBLEMS MADE IT FOR YOU TO DO YOUR WORK, TAKE CARE OF THINGS AT HOME, OR GET ALONG WITH OTHER PEOPLE: NOT DIFFICULT AT ALL
3. WORRYING TOO MUCH ABOUT DIFFERENT THINGS: NOT AT ALL
GAD7 TOTAL SCORE: 2
5. BEING SO RESTLESS THAT IT IS HARD TO SIT STILL: SEVERAL DAYS
7. FEELING AFRAID AS IF SOMETHING AWFUL MIGHT HAPPEN: NOT AT ALL
1. FEELING NERVOUS, ANXIOUS, OR ON EDGE: SEVERAL DAYS
GAD7 TOTAL SCORE: 2
2. NOT BEING ABLE TO STOP OR CONTROL WORRYING: NOT AT ALL

## 2024-08-09 ASSESSMENT — PATIENT HEALTH QUESTIONNAIRE - PHQ9
SUM OF ALL RESPONSES TO PHQ QUESTIONS 1-9: 4
5. POOR APPETITE OR OVEREATING: NOT AT ALL

## 2024-08-09 NOTE — PROGRESS NOTES
"Preceptor attestation:  Vital signs reviewed: /64   Pulse 79   Temp 97.7  F (36.5  C) (Oral)   Resp 18   Ht 1.473 m (4' 10\")   Wt 44.5 kg (98 lb 3.2 oz)   LMP 07/27/2024 (Exact Date)   SpO2 97%   BMI 20.52 kg/m      Patient seen, evaluated, and discussed with the resident.  I verified the content of the note, which accurately reflects my assessment of the patient and the plan of care.    Supervising physician: Mickey Byers MD  [facility]  "

## 2024-08-09 NOTE — PROGRESS NOTES
Assessment & Plan     Attention deficit hyperactivity disorder (ADHD), predominantly inattentive type  Symptoms stable, refill provided. Given that she has been stable on this dose, OK for 3 month refills.  - amphetamine-dextroamphetamine (ADDERALL XR) 10 MG 24 hr capsule; Take 1 capsule (10 mg) by mouth daily    Major depressive disorder, recurrent episode, in full remission (H24)  Symptoms stable, refill provided. PHQ-9 of 3 today, GAD7 of 0.  - mirtazapine (REMERON) 30 MG tablet; Take 1 tablet (30 mg) by mouth at bedtime    Return in about 3 months (around 11/9/2024) for Follow up for depression/ADHD recheck.    DO Estiven Catherine   Beth is a 23 year old, presenting for the following health issues:  Ear Problem (Left ear pain states has been going away but still wanted to check it. ) and Medication Refill      Via the Health Maintenance questionnaire, the patient has reported the following services have been completed -Cervical Cancer Screening: Planned 2024-03-24, this information has been sent to the abstraction team.  HPI   23-year-old female with a personal medical history of ADHD and MDD presenting to the clinic for medication refill as well as left ear pain.    She feels her depression symptoms are stable and she has been tolerating mirtazapine well.  Denies any SI.  Feels that things are going well for her and denies any acute stressors.      She takes Adderall mixed salts 10 mg daily and feels this improves her ADHD symptoms significantly.  She is able to focus at the tasks at hand for extended portion of the day.  Denies any side effects such as palpitations, dry mouth, GI discomfort or decreased appetite.  She reports sleeping well.    Few days ago she felt an acute pain in her left ear but it has now resolved, however since she is here she would like a provider to take a look at it.  Denies any fevers or chills or any URI symptoms.  Denies any sick contacts.        Objective    BP  "103/64   Pulse 79   Temp 97.7  F (36.5  C) (Oral)   Resp 18   Ht 1.473 m (4' 10\")   Wt 44.5 kg (98 lb 3.2 oz)   LMP 07/27/2024 (Exact Date)   SpO2 97%   BMI 20.52 kg/m    Body mass index is 20.52 kg/m .  Physical Exam   GENERAL: alert and no distress  HENT: ear canals and TM's normal, nose and mouth without ulcers or lesions  NECK: no adenopathy, no asymmetry, masses, or scars  RESP: lungs clear to auscultation - no rales, rhonchi or wheezes  CV: regular rate and rhythm, normal S1 S2, no S3 or S4, no murmur, click or rub, no peripheral edema  ABDOMEN: soft, nontender, no hepatosplenomegaly, no masses and bowel sounds normal  MS: no gross musculoskeletal defects noted, no edema        Signed Electronically by: Jitendra Mederos,   "

## 2024-09-19 ENCOUNTER — MYC REFILL (OUTPATIENT)
Dept: FAMILY MEDICINE | Facility: CLINIC | Age: 24
End: 2024-09-19
Payer: COMMERCIAL

## 2024-09-19 DIAGNOSIS — F90.0 ATTENTION DEFICIT HYPERACTIVITY DISORDER (ADHD), PREDOMINANTLY INATTENTIVE TYPE: ICD-10-CM

## 2024-09-20 RX ORDER — DEXTROAMPHETAMINE SACCHARATE, AMPHETAMINE ASPARTATE MONOHYDRATE, DEXTROAMPHETAMINE SULFATE AND AMPHETAMINE SULFATE 2.5; 2.5; 2.5; 2.5 MG/1; MG/1; MG/1; MG/1
10 CAPSULE, EXTENDED RELEASE ORAL DAILY
Qty: 30 CAPSULE | Refills: 0 | Status: SHIPPED | OUTPATIENT
Start: 2024-09-20

## 2024-09-20 NOTE — CONFIDENTIAL NOTE
checked. No issues.  Rx sent to requested pharmacy (usual pharmacy out of stock).  #30  Follow up as planned (discussed in Aug 2024 office visit)    MB

## 2024-09-28 ENCOUNTER — MYC MEDICAL ADVICE (OUTPATIENT)
Dept: FAMILY MEDICINE | Facility: CLINIC | Age: 24
End: 2024-09-28
Payer: COMMERCIAL

## 2024-09-28 DIAGNOSIS — F90.0 ATTENTION DEFICIT HYPERACTIVITY DISORDER (ADHD), PREDOMINANTLY INATTENTIVE TYPE: Primary | ICD-10-CM

## 2024-10-09 RX ORDER — DEXTROAMPHETAMINE SACCHARATE, AMPHETAMINE ASPARTATE, DEXTROAMPHETAMINE SULFATE AND AMPHETAMINE SULFATE 1.25; 1.25; 1.25; 1.25 MG/1; MG/1; MG/1; MG/1
5 TABLET ORAL 2 TIMES DAILY
Qty: 60 TABLET | Refills: 0 | Status: SHIPPED | OUTPATIENT
Start: 2024-10-09 | End: 2024-11-08

## 2024-10-09 NOTE — TELEPHONE ENCOUNTER
Trial initiated of short acting adderall 5mg in attempt to optimize food intake between doses due to decreased appetite/weight loss.    Follow up in clinic in 3-4 weeks.    Milagros Dave MD

## 2024-10-26 ENCOUNTER — MEDICAL CORRESPONDENCE (OUTPATIENT)
Dept: HEALTH INFORMATION MANAGEMENT | Facility: CLINIC | Age: 24
End: 2024-10-26
Payer: COMMERCIAL

## 2025-01-01 ENCOUNTER — MYC REFILL (OUTPATIENT)
Dept: FAMILY MEDICINE | Facility: CLINIC | Age: 25
End: 2025-01-01
Payer: COMMERCIAL

## 2025-01-01 DIAGNOSIS — F33.42 MAJOR DEPRESSIVE DISORDER, RECURRENT EPISODE, IN FULL REMISSION (H): ICD-10-CM

## 2025-01-01 DIAGNOSIS — F90.0 ATTENTION DEFICIT HYPERACTIVITY DISORDER (ADHD), PREDOMINANTLY INATTENTIVE TYPE: ICD-10-CM

## 2025-01-02 RX ORDER — DEXTROAMPHETAMINE SACCHARATE, AMPHETAMINE ASPARTATE MONOHYDRATE, DEXTROAMPHETAMINE SULFATE AND AMPHETAMINE SULFATE 2.5; 2.5; 2.5; 2.5 MG/1; MG/1; MG/1; MG/1
10 CAPSULE, EXTENDED RELEASE ORAL DAILY
Qty: 30 CAPSULE | Refills: 0 | Status: SHIPPED | OUTPATIENT
Start: 2025-01-02

## 2025-01-02 RX ORDER — MIRTAZAPINE 30 MG/1
30 TABLET, FILM COATED ORAL AT BEDTIME
Qty: 90 TABLET | Refills: 0 | Status: SHIPPED | OUTPATIENT
Start: 2025-01-02

## 2025-01-02 NOTE — TELEPHONE ENCOUNTER
mirtazapine (REMERON) 30 MG tablet         Sig: Take 1 tablet (30 mg) by mouth at bedtime.    Disp: 90 tablet    Refills: 0    Start: 1/1/2025    Class: E-Prescribe    Non-formulary For: Major depressive disorder, recurrent episode, in full remission (H)    Last ordered: 4 months ago (8/9/2024) by Jitendra Mederos DO    Atypical Antidepressants Protocol [id number]/01/2025 06:54 AM   Protocol Details Patient has PHQ-9 score less than 5 in past 6 months.    Medication active on med list    Recent (12 mo) or future (90 days) visit within the authorizing provider's specialty    Medication indicated for associated diagnosis    Patient is age 18 or older    No active pregnancy on record    No positive pregnancy test in past 12 mos       amphetamine-dextroamphetamine (ADDERALL XR) 10 MG 24 hr capsule         Sig: Take 1 capsule (10 mg) by mouth daily.    Disp: 30 capsule    Refills: 0    Start: 1/1/2025    Earliest Fill Date: 1/1/2025    Class: E-Prescribe    Non-formulary      Routed to provider due to failed RN protocol.

## 2025-01-14 ENCOUNTER — OFFICE VISIT (OUTPATIENT)
Dept: FAMILY MEDICINE | Facility: CLINIC | Age: 25
End: 2025-01-14
Payer: COMMERCIAL

## 2025-01-14 VITALS
SYSTOLIC BLOOD PRESSURE: 93 MMHG | HEART RATE: 91 BPM | TEMPERATURE: 97.7 F | DIASTOLIC BLOOD PRESSURE: 60 MMHG | WEIGHT: 95 LBS | BODY MASS INDEX: 19.94 KG/M2 | HEIGHT: 58 IN | OXYGEN SATURATION: 98 % | RESPIRATION RATE: 16 BRPM

## 2025-01-14 DIAGNOSIS — R53.83 OTHER FATIGUE: Primary | ICD-10-CM

## 2025-01-14 LAB
BASOPHILS # BLD AUTO: 0 10E3/UL (ref 0–0.2)
BASOPHILS NFR BLD AUTO: 0 %
EOSINOPHIL # BLD AUTO: 0 10E3/UL (ref 0–0.7)
EOSINOPHIL NFR BLD AUTO: 0 %
ERYTHROCYTE [DISTWIDTH] IN BLOOD BY AUTOMATED COUNT: 12.1 % (ref 10–15)
FLUAV RNA SPEC QL NAA+PROBE: NEGATIVE
FLUBV RNA RESP QL NAA+PROBE: NEGATIVE
HCT VFR BLD AUTO: 42.2 % (ref 35–47)
HGB BLD-MCNC: 13.6 G/DL (ref 11.7–15.7)
IMM GRANULOCYTES # BLD: 0 10E3/UL
IMM GRANULOCYTES NFR BLD: 0 %
LYMPHOCYTES # BLD AUTO: 1.8 10E3/UL (ref 0.8–5.3)
LYMPHOCYTES NFR BLD AUTO: 25 %
MCH RBC QN AUTO: 29.5 PG (ref 26.5–33)
MCHC RBC AUTO-ENTMCNC: 32.2 G/DL (ref 31.5–36.5)
MCV RBC AUTO: 92 FL (ref 78–100)
MONOCYTES # BLD AUTO: 0.4 10E3/UL (ref 0–1.3)
MONOCYTES NFR BLD AUTO: 6 %
NEUTROPHILS # BLD AUTO: 5 10E3/UL (ref 1.6–8.3)
NEUTROPHILS NFR BLD AUTO: 69 %
PLATELET # BLD AUTO: 279 10E3/UL (ref 150–450)
RBC # BLD AUTO: 4.61 10E6/UL (ref 3.8–5.2)
RSV RNA SPEC NAA+PROBE: NEGATIVE
SARS-COV-2 RNA RESP QL NAA+PROBE: NEGATIVE
WBC # BLD AUTO: 7.3 10E3/UL (ref 4–11)

## 2025-01-14 PROCEDURE — 36415 COLL VENOUS BLD VENIPUNCTURE: CPT | Performed by: FAMILY MEDICINE

## 2025-01-14 PROCEDURE — 85025 COMPLETE CBC W/AUTO DIFF WBC: CPT | Performed by: FAMILY MEDICINE

## 2025-01-14 PROCEDURE — 87637 SARSCOV2&INF A&B&RSV AMP PRB: CPT | Performed by: FAMILY MEDICINE

## 2025-01-14 PROCEDURE — 99214 OFFICE O/P EST MOD 30 MIN: CPT | Performed by: FAMILY MEDICINE

## 2025-01-14 PROCEDURE — 83540 ASSAY OF IRON: CPT | Performed by: FAMILY MEDICINE

## 2025-01-14 PROCEDURE — 82728 ASSAY OF FERRITIN: CPT | Performed by: FAMILY MEDICINE

## 2025-01-14 PROCEDURE — 82746 ASSAY OF FOLIC ACID SERUM: CPT | Performed by: FAMILY MEDICINE

## 2025-01-14 PROCEDURE — 82607 VITAMIN B-12: CPT | Performed by: FAMILY MEDICINE

## 2025-01-14 PROCEDURE — 80048 BASIC METABOLIC PNL TOTAL CA: CPT | Performed by: FAMILY MEDICINE

## 2025-01-14 PROCEDURE — 83550 IRON BINDING TEST: CPT | Performed by: FAMILY MEDICINE

## 2025-01-14 NOTE — PROGRESS NOTES
Assessment & Plan     Other fatigue  Patient with two weeks of severe fatigue in setting of recent fever/URI and sick contacts at home. Vitals are within normal limits. Examination with no focal findings. Lungs are clear to auscultation. I suspect this is likely post viral fatigue. Will obtain flu/RSV/COVID swab and basic labs including CBC, BMP. Will obtain iron studies as patient has history of iron deficiency anemia and is not taking iron. Will also check ferritin/B12 as patient is vegetarian with history of nutritional deficiency.   - Basic metabolic panel  - CBC with Platelets & Differential  - Iron and iron binding capacity  - Vitamin B12  - Folate  - Ferritin  - Influenza A/B, RSV and SARS-CoV2 PCR (COVID-19)  - Influenza A/B, RSV and SARS-CoV2 PCR (COVID-19) Nose  - Ferritin  - Folate  - Vitamin B12  - Iron and iron binding capacity  - CBC with Platelets & Differential  - Basic metabolic panel      No follow-ups on file.    Subjective   Beth is a 24 year old, presenting for the following health issues:  Fatigue (X 3 weeks )        1/14/2025     4:14 PM   Additional Questions   Roomed by    Accompanied by partner         1/14/2025    Information    services provided? No     HPI     Aliza Real is a 25 yo F with a history of MDD, PTSD, panic disorder, eating disorder, iron deficiency anemia who presents to clinic for evaluation of fatigue. The patient reports that approximately two weeks ago she developed fever, cough, sore throat, and runny nose. She had a Tmax of 102F and was taking dayquil/nyquil intermittently. She says that the initial symptoms have mostly resolved however she has been severely fatigued with muscle aches for the past week. She has also noticed increased shortness of breath with going up stairs/swing dancing which she is normally able to do without difficulty. She states that her appetite is okay and she drinks one water bottle per day. She denies chest  "pain, abdominal pain, nausea, vomiting, diarrhea, or urinary symptoms. Her boyfriend and roommates have also recently been sick with URI symptoms.     Review of Systems  Constitutional, HEENT, cardiovascular, pulmonary, gi and gu systems are negative, except as otherwise noted.      Objective    BP 93/60   Pulse 91   Temp 97.7  F (36.5  C) (Tympanic)   Resp 16   Ht 1.467 m (4' 9.76\")   Wt 43.1 kg (95 lb)   LMP 12/20/2024 (Exact Date)   SpO2 98%   BMI 20.02 kg/m    Body mass index is 20.02 kg/m .  Physical Exam  Constitutional:       Appearance: Normal appearance.   HENT:      Head: Normocephalic.      Right Ear: Tympanic membrane normal.      Left Ear: Tympanic membrane normal.      Nose: Nose normal.      Mouth/Throat:      Mouth: Mucous membranes are moist.      Pharynx: Oropharynx is clear. No oropharyngeal exudate or posterior oropharyngeal erythema.   Eyes:      Conjunctiva/sclera: Conjunctivae normal.   Cardiovascular:      Rate and Rhythm: Normal rate and regular rhythm.      Pulses: Normal pulses.      Heart sounds: Normal heart sounds. No murmur heard.  Pulmonary:      Effort: Pulmonary effort is normal.      Breath sounds: Normal breath sounds. No wheezing.   Abdominal:      General: Abdomen is flat.      Palpations: Abdomen is soft.      Tenderness: There is no abdominal tenderness. There is no right CVA tenderness or left CVA tenderness.   Neurological:      General: No focal deficit present.      Mental Status: She is alert and oriented to person, place, and time.   Psychiatric:         Mood and Affect: Mood normal.         Behavior: Behavior normal.         {Diagnostic Test Results (Optional):491786}      Jeremias Greene MS3    Signed Electronically by: Richa Howell MD  {Email feedback regarding this note to [email or web address]   :[id number]}  "

## 2025-01-15 LAB
ANION GAP SERPL CALCULATED.3IONS-SCNC: 9 MMOL/L (ref 7–15)
BUN SERPL-MCNC: 10.6 MG/DL (ref 6–20)
CALCIUM SERPL-MCNC: 9.7 MG/DL (ref 8.8–10.4)
CHLORIDE SERPL-SCNC: 105 MMOL/L (ref 98–107)
CREAT SERPL-MCNC: 0.56 MG/DL (ref 0.51–0.95)
EGFRCR SERPLBLD CKD-EPI 2021: >90 ML/MIN/1.73M2
FERRITIN SERPL-MCNC: 19 NG/ML (ref 6–175)
FOLATE SERPL-MCNC: 7.3 NG/ML (ref 4.6–34.8)
GLUCOSE SERPL-MCNC: 100 MG/DL (ref 70–99)
HCO3 SERPL-SCNC: 27 MMOL/L (ref 22–29)
IRON BINDING CAPACITY (ROCHE): 423 UG/DL (ref 240–430)
IRON SATN MFR SERPL: 20 % (ref 15–46)
IRON SERPL-MCNC: 83 UG/DL (ref 37–145)
POTASSIUM SERPL-SCNC: 4.2 MMOL/L (ref 3.4–5.3)
SODIUM SERPL-SCNC: 141 MMOL/L (ref 135–145)
VIT B12 SERPL-MCNC: 381 PG/ML (ref 232–1245)

## 2025-01-17 NOTE — RESULT ENCOUNTER NOTE
Results for orders placed or performed in visit on 01/14/25  -Influenza A/B, RSV and SARS-CoV2 PCR (COVID-19) Nose:      Status: Normal  Specimen: Nose; Swab       Result                                            Value                         Ref Range                       Influenza A PCR                                   Negative                      Negative                        Influenza B PCR                                   Negative                      Negative                        RSV PCR                                           Negative                      Negative                        SARS CoV2 PCR                                     Negative                      Negative                       Narrative    Testing was performed using the Xpert Xpress CoV2/Flu/RSV Assay on the [employer] GeneXpert Instrument. This test should be ordered for the detection of SARS-CoV2, influenza, and RSV viruses in individuals with signs and symptoms of respiratory tract infection. This test is for in vitro diagnostic use under the [place] FDA for laboratories certified under CLIA to perform high or moderate complexity testing. This test has been [place] FDA cleared. A negative result does not rule out the presence of PCR inhibitors in the specimen or target RNA in concentration below the limit of detection for the assay. If only one viral target is positive but coinfection with multiple targets is suspected, the sample should be re-tested with another FDA cleared, approved, or authorized test, if coninfection would change clinical management. This test was validated by the [facility]. These laboratories are certified under the Clinical Laboratory Improvement Amendments of 1988 (CLIA-88) as qualified to perfom high complexity laboratory testing.  -Ferritin:      Status: Normal       Result                                            Value                         Ref Range                       Ferritin                                           19                            6 - 175 ng/mL              -Folate:      Status: Normal       Result                                            Value                         Ref Range                       Folic Acid                                        7.3                           4.6 - 34.8 ng/mL           -Vitamin B12:      Status: Normal       Result                                            Value                         Ref Range                       Vitamin B12                                       381                           232 - 1,245 pg/mL          -Iron and iron binding capacity:      Status: Normal       Result                                            Value                         Ref Range                       Iron                                              83                            37 - 145 ug/dL                  Iron Binding Capacity                             423                           240 - 430 ug/dL                 Iron Sat Index                                    20                            15 - 46 %                  -Basic metabolic panel:      Status: Abnormal       Result                                            Value                         Ref Range                       Sodium                                            141                           135 - 145 mmol/L                Potassium                                         4.2                           3.4 - 5.3 mmol/L                Chloride                                          105                           98 - 107 mmol/L                 Carbon Dioxide (CO2)                              27                            22 - 29 mmol/L                  Anion Gap                                         9                             7 - 15 mmol/L                   Urea Nitrogen                                     10.6                          6.0 - 20.0 mg/dL                 Creatinine                                        0.56                          0.51 - 0.95 mg/dL               GFR Estimate                                      >90                           >60 mL/min/1.73m2               Calcium                                           9.7                           8.8 - 10.4 mg/dL                Glucose                                           100 (H)                       70 - 99 mg/dL              -CBC with platelets and differential:      Status: None       Result                                            Value                         Ref Range                       WBC Count                                         7.3                           4.0 - 11.0 10e3/uL              RBC Count                                         4.61                          3.80 - 5.20 10e6/uL             Hemoglobin                                        13.6                          11.7 - 15.7 g/dL                Hematocrit                                        42.2                          35.0 - 47.0 %                   MCV                                               92                            78 - 100 fL                     MCH                                               29.5                          26.5 - 33.0 pg                  MCHC                                              32.2                          31.5 - 36.5 g/dL                RDW                                               12.1                          10.0 - 15.0 %                   Platelet Count                                    279                           150 - 450 10e3/uL               % Neutrophils                                     69                            %                               % Lymphocytes                                     25                            %                               % Monocytes                                       6                             %                                % Eosinophils                                     0                             %                               % Basophils                                       0                             %                               % Immature Granulocytes                           0                             %                               Absolute Neutrophils                              5.0                           1.6 - 8.3 10e3/uL               Absolute Lymphocytes                              1.8                           0.8 - 5.3 10e3/uL               Absolute Monocytes                                0.4                           0.0 - 1.3 10e3/uL               Absolute Eosinophils                              0.0                           0.0 - 0.7 10e3/uL               Absolute Basophils                                0.0                           0.0 - 0.2 10e3/uL               Absolute Immature Granulocytes                    0.0                           <=0.4 10e3/uL              -CBC with Platelets & Differential:      Status: None      Narrative    The following orders were created for panel order CBC with Platelets & Differential.    Procedure                               Abnormality         Status                       ---------                               -----------         ------                       CBC with platelets and d...[[id number]]                      Final result                     Please view results for these tests on the individual orders.

## 2025-01-19 ENCOUNTER — MYC MEDICAL ADVICE (OUTPATIENT)
Dept: FAMILY MEDICINE | Facility: CLINIC | Age: 25
End: 2025-01-19
Payer: COMMERCIAL

## 2025-01-19 DIAGNOSIS — Z86.59 HISTORY OF EATING DISORDER: Primary | ICD-10-CM

## 2025-01-28 ENCOUNTER — OFFICE VISIT (OUTPATIENT)
Dept: FAMILY MEDICINE | Facility: CLINIC | Age: 25
End: 2025-01-28
Payer: COMMERCIAL

## 2025-01-28 VITALS
TEMPERATURE: 98 F | HEIGHT: 58 IN | HEART RATE: 90 BPM | SYSTOLIC BLOOD PRESSURE: 97 MMHG | DIASTOLIC BLOOD PRESSURE: 64 MMHG | BODY MASS INDEX: 19.86 KG/M2 | WEIGHT: 94.6 LBS | OXYGEN SATURATION: 97 % | RESPIRATION RATE: 20 BRPM

## 2025-01-28 DIAGNOSIS — F90.0 ATTENTION DEFICIT HYPERACTIVITY DISORDER (ADHD), PREDOMINANTLY INATTENTIVE TYPE: Primary | ICD-10-CM

## 2025-01-28 DIAGNOSIS — R06.02 SHORTNESS OF BREATH: ICD-10-CM

## 2025-01-28 DIAGNOSIS — R53.83 OTHER FATIGUE: ICD-10-CM

## 2025-01-28 PROCEDURE — 99214 OFFICE O/P EST MOD 30 MIN: CPT | Mod: GC

## 2025-01-28 RX ORDER — DEXTROAMPHETAMINE SACCHARATE, AMPHETAMINE ASPARTATE, DEXTROAMPHETAMINE SULFATE AND AMPHETAMINE SULFATE 1.25; 1.25; 1.25; 1.25 MG/1; MG/1; MG/1; MG/1
5 TABLET ORAL 2 TIMES DAILY
Qty: 60 TABLET | Refills: 0 | Status: SHIPPED | OUTPATIENT
Start: 2025-01-28 | End: 2025-02-27

## 2025-01-28 ASSESSMENT — PATIENT HEALTH QUESTIONNAIRE - PHQ9
SUM OF ALL RESPONSES TO PHQ QUESTIONS 1-9: 16
10. IF YOU CHECKED OFF ANY PROBLEMS, HOW DIFFICULT HAVE THESE PROBLEMS MADE IT FOR YOU TO DO YOUR WORK, TAKE CARE OF THINGS AT HOME, OR GET ALONG WITH OTHER PEOPLE: VERY DIFFICULT
SUM OF ALL RESPONSES TO PHQ QUESTIONS 1-9: 16

## 2025-01-28 NOTE — PROGRESS NOTES
"  {PROVIDER CHARTING PREFERENCE:045435}    Subjective   Beth is a 24 year old, presenting for the following health issues:  No chief complaint on file.      1/28/2025     4:17 PM   Additional Questions   Roomed by oscar   Accompanied by partner         1/28/2025    Information    services provided? No     HPI     Increasing SOB since viral illness   More fatigued  16hrs of sleep last night   Woke up at 1:22 PM this afternoon, feeling fatigued,  Feels memory things have been worse, confusion, \"wait what I said that in this room\"  Cleo is down at home, keeps forgetting this   Woke up at 7pm last night, didn't think she could go to sleep, but then went to bed at 11pm and went to sleep by 12am (little bit longer)   \"Struggling to assess the situation she is in\"     \"Mood swings\" over this time ---> oscillating between feeling productive and feeling very fatigued    \"Age regressing a lot more\" --> without realizing I act a lot younger than I am       ?MDD/SHAHRZAD/PTSD  Feels it might be worsening, not sure, feels it also could be long covid    ?Nutrition - on 5/14 scheduled                Objective    BP 97/64 (BP Location: Right arm, Patient Position: Sitting, Cuff Size: Adult Regular)   Pulse 90   Temp 98  F (36.7  C) (Oral)   Resp 20   Ht 1.471 m (4' 9.9\")   Wt 42.9 kg (94 lb 9.6 oz)   LMP 01/20/2025 (Exact Date)   SpO2 97%   BMI 19.84 kg/m    Body mass index is 19.84 kg/m .  Physical Exam   GENERAL: alert and no distress  NECK: no adenopathy, no asymmetry, masses, or scars  RESP: lungs clear to auscultation - no rales, rhonchi or wheezes  CV: regular rate and rhythm, normal S1 S2, no S3 or S4, no murmur, click or rub, no peripheral edema  ABDOMEN: soft, nontender, no hepatosplenomegaly, no masses and bowel sounds normal  MS: no gross musculoskeletal defects noted, no edema        Signed Electronically by: Jitendra Mederos, DO  " "   services provided? No     HPI   Aliza Real is a 23 yo F with a history of MDD, PTSD, panic disorder, eating disorder, iron deficiency anemia who presents to clinic for evaluation of fatigue.  Additionally presents for ADHD follow-up.    Recently seen in clinic on 1/14/2025 for similar symptoms, lab workup at that time was largely negative.  Suspected it was most likely postviral fatigue.    Since then she feels that she has had slight increasing in shortness of breath, and feels more fatigued.  She reports sleeping 16 hours last night, and feels her memory has been somewhat worse as well.  Reports mood swings over this time that include oscillating between feeling productive and feeling very fatigued.  Due to her PTSD she can have age regression symptoms which are occurring more as well.  No SI or thoughts of self-harm.  She is concerned this could be due to long COVID.    Additionally, at her last visit with me we adjusted her short acting Adderall to long-acting formulation.  She feels now that the short acting worked better and would like to switch back.  She has not followed up with or scheduled with psychiatry.    She has appointment with a nutritionist on 5/14/2025.        Objective    BP 97/64 (BP Location: Right arm, Patient Position: Sitting, Cuff Size: Adult Regular)   Pulse 90   Temp 98  F (36.7  C) (Oral)   Resp 20   Ht 1.471 m (4' 9.9\")   Wt 42.9 kg (94 lb 9.6 oz)   LMP 01/20/2025 (Exact Date)   SpO2 97%   BMI 19.84 kg/m    Body mass index is 19.84 kg/m .  Physical Exam   GENERAL: alert and no distress  HEENT: Clear oropharynx  NECK: no adenopathy, no asymmetry, masses, or scars.  No thyroid goiter.  RESP: lungs clear to auscultation - no rales, rhonchi or wheezes  CV: regular rate and rhythm, normal S1 S2, no S3 or S4, no murmur, click or rub, no peripheral edema  MS: no gross musculoskeletal defects noted, no edema  SKIN: No bruises or bleeding or rash, normal skin color " texture and turgor.        Signed Electronically by: Jitendra Mederos DO

## 2025-01-29 ENCOUNTER — LAB (OUTPATIENT)
Dept: LAB | Facility: CLINIC | Age: 25
End: 2025-01-29
Payer: COMMERCIAL

## 2025-01-29 DIAGNOSIS — R06.02 SHORTNESS OF BREATH: ICD-10-CM

## 2025-01-29 LAB
CRP SERPL-MCNC: <3 MG/L
D DIMER PPP FEU-MCNC: 0.63 UG/ML FEU (ref 0–0.5)
ERYTHROCYTE [SEDIMENTATION RATE] IN BLOOD BY WESTERGREN METHOD: 7 MM/HR (ref 0–20)
T4 FREE SERPL-MCNC: 1.04 NG/DL (ref 0.9–1.7)
TSH SERPL DL<=0.005 MIU/L-ACNC: 5.17 UIU/ML (ref 0.3–4.2)

## 2025-01-29 PROCEDURE — 84443 ASSAY THYROID STIM HORMONE: CPT

## 2025-01-29 PROCEDURE — 36415 COLL VENOUS BLD VENIPUNCTURE: CPT

## 2025-01-29 PROCEDURE — 85379 FIBRIN DEGRADATION QUANT: CPT

## 2025-01-29 PROCEDURE — 84439 ASSAY OF FREE THYROXINE: CPT

## 2025-01-29 PROCEDURE — 85652 RBC SED RATE AUTOMATED: CPT

## 2025-01-29 PROCEDURE — 86140 C-REACTIVE PROTEIN: CPT

## 2025-01-30 DIAGNOSIS — R06.02 SHORTNESS OF BREATH: Primary | ICD-10-CM

## 2025-02-03 NOTE — PROGRESS NOTES
"Preceptor attestation:  Vital signs reviewed: BP 97/64 (BP Location: Right arm, Patient Position: Sitting, Cuff Size: Adult Regular)   Pulse 90   Temp 98  F (36.7  C) (Oral)   Resp 20   Ht 1.471 m (4' 9.9\")   Wt 42.9 kg (94 lb 9.6 oz)   LMP 01/20/2025 (Exact Date)   SpO2 97%   BMI 19.84 kg/m      Patient seen, evaluated, and discussed with the resident.  I verified the content of the note, which accurately reflects my assessment of the patient and the plan of care.    Supervising physician: Richa Howell MD  [facility]  "

## 2025-02-07 ENCOUNTER — HOSPITAL ENCOUNTER (OUTPATIENT)
Dept: CT IMAGING | Facility: CLINIC | Age: 25
Discharge: HOME | End: 2025-02-07
Attending: FAMILY MEDICINE | Admitting: FAMILY MEDICINE
Payer: COMMERCIAL

## 2025-02-07 DIAGNOSIS — R06.02 SHORTNESS OF BREATH: ICD-10-CM

## 2025-02-07 PROCEDURE — 250N000009 HC RX 250: Performed by: FAMILY MEDICINE

## 2025-02-07 PROCEDURE — 71275 CT ANGIOGRAPHY CHEST: CPT

## 2025-02-07 PROCEDURE — 71275 CT ANGIOGRAPHY CHEST: CPT | Mod: 26 | Performed by: RADIOLOGY

## 2025-02-07 PROCEDURE — 250N000011 HC RX IP 250 OP 636: Performed by: FAMILY MEDICINE

## 2025-02-07 RX ORDER — IOPAMIDOL 755 MG/ML
44 INJECTION, SOLUTION INTRAVASCULAR ONCE
Status: COMPLETED | OUTPATIENT
Start: 2025-02-07 | End: 2025-02-07

## 2025-02-07 RX ADMIN — IOPAMIDOL 44 ML: 755 INJECTION, SOLUTION INTRAVENOUS at 12:29

## 2025-02-07 RX ADMIN — SODIUM CHLORIDE, PRESERVATIVE FREE 74 ML: 5 INJECTION INTRAVENOUS at 12:29

## 2025-02-11 ENCOUNTER — MYC MEDICAL ADVICE (OUTPATIENT)
Dept: FAMILY MEDICINE | Facility: CLINIC | Age: 25
End: 2025-02-11

## 2025-02-20 ENCOUNTER — THERAPY VISIT (OUTPATIENT)
Dept: PHYSICAL THERAPY | Facility: CLINIC | Age: 25
End: 2025-02-20
Payer: COMMERCIAL

## 2025-02-20 DIAGNOSIS — I95.1 ORTHOSTATIC HYPOTENSION: Primary | ICD-10-CM

## 2025-02-20 NOTE — PATIENT INSTRUCTIONS
Non-medication strategies    Ensuring fluid repletion (2-3 L/day)  - Drink a big glass of water first thing in the morning to counter the dehydration that occurs while sleeping  - Consider pre-hydrating before activity (ex. showering, standing to cook a meal, exercise)  - Electrolyte options include Pedialyte, Liquid IV, [place], Drip Drop, LMNT, GatorLyte, Hydromate, TriOral, Buoy (to name a few), Just Ingredients.       Consume small and frequent meals, rather than large meals to avoid significant shifts in blood volume.    Avoid exacerbating factors including standing for a long time, warm environments, and dehydration.  - Showering: try using cooler water temperature, shorter shower length, sitting on a shower chair.    Try compression garments - knees up to abdomen is most effective (shapewear, tight biker shorts, abdominal binder). This helps to push blood that pools in the lower abdomen and legs back up to the heart and head where it is most needed.    Use physical counterpressure maneuvers when sitting or standing for long period of time:  - Crossing arms or legs and pressing them together.  - Lean forward to compress the trunk.  - Pump your ankles up and down.    Elevate the head of the bed 4-6  by placing objects under the head of the bed frame, or using a whole body wedge (not just from your hips to your head).

## 2025-02-20 NOTE — PROGRESS NOTES
"PHYSICAL THERAPY EVALUATION  Type of Visit: Evaluation              Subjective         Presenting condition or subjective complaint: Exhaustion, dizziness    Initially presented 1/14/2025 with complaints of of fatigue, SOB following viral illness, now reporting presyncopal episodes that appear to be postural and related to exertion.     Reports memory issues for quite sometime, but now these are worse since this started.  Unsure of how it started - gradually or suddenly.  Started after an illness.  Has been consistent since then.  Notices that when on top of hydration, and eating things are better.  Is resting much more.  Gets about 4 hours of productivity.   Will spend quite a bit of time sleeping.  Sleeping about 18 hours of the day.    When upright feels heavy, echoy, can get darkness around vision.  Standing and walker are harder.  Might be OOB a bit. Hasn't fainted but might have come close.  Reports that one time she was \"out of it\" for about 10 minutes.       Last year had a H.Pylori infection and that caused some fatigue as well - similar sx to this.     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         Date of onset: (P) 01/01/25    Relevant medical history:     Past Medical History:   Diagnosis Date    ADHD (attention deficit hyperactivity disorder)     Growth hormone deficiency     Normal MRI     Short stature     Strabismus     ET      Dates & types of surgery:      Prior diagnostic imaging/testing results: CT scan     Prior therapy history for the same diagnosis, illness or injury: No      Prior Level of Function  Transfers: Independent  Ambulation: Independent  ADL: Independent  IADL: Driving, Housekeeping, Laundry, Meal preparation, Work - not driving, and limiting how much she is doing around the house.  Can get tried and unable to finish things    Living Environment  Social support: With a significant other or spouse   Type " of home: House   Stairs to enter the home: Yes 2 Is there a railing: Yes     Ramp: No   Stairs inside the home: Yes 12 Is there a railing: Yes     Help at home: None  Equipment owned:       Employment: Yes Window washer  Hobbies/Interests:  dancing like getting out of bed is tough to get it ADHD I have as well it is like less although he should she be trying to counter that or is it okay to lay in bed for an hour or so that before I did not know if that would like to know the morning what ever the next morning when you lay there like I okay you got this for you you can drive yourself like print out all those things I just told you so we will set any exercise today next time    Patient goals for therapy: Daily tasks    Pain assessment: Pain denied     Objective      Cognitive Status Examination  Orientation: Oriented to person, place and time   Level of Consciousness: Alert  Follows Commands and Answers Questions: 100% of the time  Personal Safety and Judgement: Intact  Memory: Impaired due to PTSD    OBSERVATION: Arrives with Angus CASTILLOARY: Intact  POSTURE: WNL  PALPATION: NA  RANGE OF MOTION: LE ROM WFL  STRENGTH: LE Strength WFL    BED MOBILITY: WFL - dizziness with stand from supine     TRANSFERS: WNL    WHEELCHAIR MOBILITY: NA    GAIT:   Level of [place]: WNL  Assistive Device(s): None  Gait Deviations: WNL  Gait Distance: 20 feet  Stairs:         VITALS WITH POSITION CHANGES:                                                Sx                                          HR                                          BP  Supine 3 minutes none 74 99/62   Supine 5 minutes none 74 100/64   Stand 0 dizzy 88 95/64   Stand 1 Improved dizziness 89 100/66   Stand 3 Doing ok 87 100/65   Stand 5 Doing ok 92 97/64   Stand 7 Doing ok 82 98/68   Stand 10 Doing ok 95 94/63     No abnormalities found on vital testing today    Patient specific functional score form     Swim and Dancing - 3  Work - 1  Driving -2        VESTIBULAR EVALUATION  ADDITIONAL HISTORY:  Description of symptoms:  fatigue dizziness  Dizzy attacks:   Start: december   Last attack: yesterday   Frequency of occurrences: daily   Length of attack: anywhere from 10 minutes to 2 hours  Difficulty hearing:    Noise in ears? No    Alleviates symptoms: lying down with legs elevated above heart. eating protien  Worsens symptoms: not eating or drinking. any physical activity, such as walking or climbing stairs. movement in general  Activities that bring on symptoms: Walking up or down stairs       DHI: Total Score: (Patient-Rptd) 68          Assessment & Plan   CLINICAL IMPRESSIONS  Medical Diagnosis: Orthostatic hypotension  Postural dizziness with presyncope    Treatment Diagnosis: Dizziness   Impression/Assessment: Patient is a 24 year old female with fatigue and dizziness complaints.  Patient completed the active stand test and no abnormalities were found.  Given the symptoms of deconditioning and fatigue she would benefit from a course of skilled PT intervention for graded exercise program at home management of symptoms.  She is in agreement with this plan.  The following significant findings have been identified: Decreased activity tolerance and Dizziness. These impairments interfere with their ability to perform work tasks, recreational activities, household chores, driving , household mobility, and community mobility as compared to previous level of function.     Clinical Decision Making (Complexity):  Clinical Presentation: Evolving/Changing  Clinical Presentation Rationale: based on medical and personal factors listed in PT evaluation  Clinical Decision Making (Complexity): Moderate complexity    PLAN OF CARE  Treatment Interventions:  Interventions: Neuromuscular Re-education, Therapeutic Activity, Therapeutic Exercise, Self-Care/Home Management    Long Term Goals     PT Goal 1  Goal Identifier: (P) HEP  Goal Description: (P) Patient will be independent  with HEP to allow for continued improvements in health and wellness upon DC  Rationale: (P) to maximize safety and independence within the community  Target Date: (P) 05/20/25  PT Goal 2  Goal Identifier: (P) Dancing  Goal Description: (P) Patient to report abiltiy to dance with 75% of participation as prior  Target Date: (P) 05/20/25  PT Goal 3  Goal Identifier: (P) Work  Goal Description: (P) Patient will report ability to return to work at 75% of level as prior  Target Date: (P) 05/20/25      Frequency of Treatment: (P) 8 sessions  Duration of Treatment: (P) 90 days    Recommended Referrals to Other Professionals:   Education Assessment:        Risks and benefits of evaluation/treatment have been explained.   Patient/Family/caregiver agrees with Plan of Care.     Evaluation Time:     PT Eval, Moderate Complexity Minutes (77855): (P) 30       Signing Clinician: Lisandra Gonzalez PT

## 2025-02-24 ENCOUNTER — OFFICE VISIT (OUTPATIENT)
Dept: PSYCHOLOGY | Facility: CLINIC | Age: 25
End: 2025-02-24
Payer: COMMERCIAL

## 2025-02-24 DIAGNOSIS — F43.10 POSTTRAUMATIC STRESS DISORDER: Primary | ICD-10-CM

## 2025-02-24 DIAGNOSIS — F90.0 ATTENTION DEFICIT HYPERACTIVITY DISORDER (ADHD), PREDOMINANTLY INATTENTIVE TYPE: ICD-10-CM

## 2025-02-24 DIAGNOSIS — G47.00 INSOMNIA, UNSPECIFIED TYPE: ICD-10-CM

## 2025-02-24 DIAGNOSIS — F33.0 MILD EPISODE OF RECURRENT MAJOR DEPRESSIVE DISORDER: ICD-10-CM

## 2025-02-24 PROCEDURE — 99245 OFF/OP CONSLTJ NEW/EST HI 55: CPT | Performed by: PSYCHIATRY & NEUROLOGY

## 2025-02-24 PROCEDURE — 99417 PROLNG OP E/M EACH 15 MIN: CPT | Performed by: PSYCHIATRY & NEUROLOGY

## 2025-02-24 NOTE — Clinical Note
Hey intake folks, I just went over this patient with Kenna and she agreed to see her for a new patient evaluation. Patient is expecting a call. Please reach out when able, thank you!! -Daljit

## 2025-02-24 NOTE — Clinical Note
Here is the note. I cleaned up the assessment, but did end up using part of the AI generated note. Please let me know if you need any more information, and thank you again for seeing her! I owe you one :)
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
## 2025-02-27 ENCOUNTER — THERAPY VISIT (OUTPATIENT)
Dept: PHYSICAL THERAPY | Facility: CLINIC | Age: 25
End: 2025-02-27
Attending: FAMILY MEDICINE
Payer: COMMERCIAL

## 2025-02-27 DIAGNOSIS — I95.1 ORTHOSTATIC HYPOTENSION: ICD-10-CM

## 2025-02-27 DIAGNOSIS — R55 POSTURAL DIZZINESS WITH PRESYNCOPE: ICD-10-CM

## 2025-02-27 DIAGNOSIS — R42 POSTURAL DIZZINESS WITH PRESYNCOPE: ICD-10-CM

## 2025-02-27 NOTE — PATIENT INSTRUCTIONS
Exercise Plan    Lay down and rest quietly for 2-3 minutes - take your heart rate.  This will be your resting HR.    15-20 minutes of exercise with heart rate 15-20 bpm above your baseline     Laying down:   Heel slides, marches, legs out and in like a butterfly, tap your toes   Arms movements - snow angels, arms overhead      Track your resting heart rate    Look at graph on phone and see if any trends emerge with a larger spell and your heart rate around that time          Pedal bike on [employer]: [employer] Under Desk Bike Pedal Exerciser Mini Bike for Leg/Arm Bike Foldable Peddler with LCD Display for Home/Office

## 2025-03-02 ENCOUNTER — HEALTH MAINTENANCE LETTER (OUTPATIENT)
Age: 25
End: 2025-03-02

## 2025-03-05 ENCOUNTER — VIRTUAL VISIT (OUTPATIENT)
Dept: FAMILY MEDICINE | Facility: CLINIC | Age: 25
End: 2025-03-05
Payer: COMMERCIAL

## 2025-03-05 DIAGNOSIS — E03.8 SUBCLINICAL HYPOTHYROIDISM: Primary | ICD-10-CM

## 2025-03-05 DIAGNOSIS — R55 POSTURAL DIZZINESS WITH PRESYNCOPE: ICD-10-CM

## 2025-03-05 DIAGNOSIS — R63.8 INADEQUATE ORAL INTAKE: ICD-10-CM

## 2025-03-05 DIAGNOSIS — F43.10 PTSD (POST-TRAUMATIC STRESS DISORDER): ICD-10-CM

## 2025-03-05 DIAGNOSIS — R42 POSTURAL DIZZINESS WITH PRESYNCOPE: ICD-10-CM

## 2025-03-05 PROCEDURE — 98005 SYNCH AUDIO-VIDEO EST LOW 20: CPT | Performed by: STUDENT IN AN ORGANIZED HEALTH CARE EDUCATION/TRAINING PROGRAM

## 2025-03-05 ASSESSMENT — PATIENT HEALTH QUESTIONNAIRE - PHQ9
10. IF YOU CHECKED OFF ANY PROBLEMS, HOW DIFFICULT HAVE THESE PROBLEMS MADE IT FOR YOU TO DO YOUR WORK, TAKE CARE OF THINGS AT HOME, OR GET ALONG WITH OTHER PEOPLE: VERY DIFFICULT
SUM OF ALL RESPONSES TO PHQ QUESTIONS 1-9: 17
SUM OF ALL RESPONSES TO PHQ QUESTIONS 1-9: 17

## 2025-03-05 NOTE — PROGRESS NOTES
Chief Complaint   Patient presents with    Follow Up       There are no exam notes on file for this visit.        Assessment/Plan:  Beth Real is a 24 year old female here for follow up possible POTS sx.  Appreciates PT and advice from Dr. Traore, set goals around PO intake to improve her exhaustion/presyncopal symptoms. See pt instructions.  She has an appt coming up with psych NP which she will keep and continue psychiatric medications the same until that time. .    Note had subclinical hypothyroidism at last lab draw, recheck after 6w    Maribel was seen today for follow up.    Diagnoses and all orders for this visit:    Subclinical hypothyroidism  -     TSH; Future  -     T4 free; Future    Postural dizziness with presyncope    PTSD (post-traumatic stress disorder)    Inadequate oral intake        Total video time 21 minutes    Follow-up with Milagros Dave in 1 mo for POTS sx.    Future Appointments   Date Time Provider Department Center   3/24/2025 10:00 AM Kenna Cisneros APRN CNP [facility]   5/14/2025  1:00 PM Paula Valentin, RD, LD [facility]       Milagros Dave MD      Patient Instructions   1) Try to make sure you eat 2-3 meals per day!  More than just plain noodles.  You need vegetable, more complex carbohydrates (wheat noodles/bread/brown rice), and fruits and vegetables.  2) Make sure you are drinking electrolytes as well, or Salt Stik capsules for electrolytes.  Want to urinate every 3-4 months.         Subjective:  Beth Real is a 24 year old female here for follow up for medication recommendations.    She had a good experience with Dr. Traore.  Intake was thorough, went over things very in depth.  Thought meds were fine but could explore others in the future, thought could happen with psychiatrist in the future.  Want lifestyle changes - eating, sleep schedule, hydration.  Has had sleep trouble lately in addition to other things.     Sleeping too  "much -   Needs a set bedtime and set wakeup time.  Also thinks it would be helpful to have a diagnosis for what is going on - current theory POTS. First appt wasn't but second one was elevated.  \"Tired is way of explaining symptoms\" but unsure if POTS or if just tired.   Getting exhausted easily - Seems like losing blood flow to brain when says feeling exhausted. Lightheaded, dizzy, vision with dark spots around it, like looking through heat waves.   Seems related to changing position. Cans to road - grabbed trash bag and stood up and said \"I'm going down\" and laid down on kitchen floor and heavily breathing.  Getting up slowly.   After that tired but not sleepy.    Last night slept 11 hours - 2:36 to 10:55    Eating and drinking - lately eating mostly at nighttime  This morning two bowls of pesto noodles  Eats when hungry but doesn't eat a balanced diet.  Eats when appetizing food around her but no motivation to get food for herself.    Total time 24 minutes      Patient Active Problem List   Diagnosis    GHD (growth hormone deficiency)    Short stature    Esotropia    Diplopia    Myopia with astigmatism    Attention deficit hyperactivity disorder (ADHD), predominantly inattentive type    Acute pain of right knee    Major depression, recurrent    Posttraumatic stress disorder       Current Outpatient Medications   Medication Sig Dispense Refill    ammonium lactate (LAC-HYDRIN) 12 % external lotion Apply topically 2 times daily as needed for dry skin. 225 g 1    hydrOXYzine (ATARAX) 10 MG tablet Take 1-2 tablets (10-20 mg) by mouth every 8 hours as needed for anxiety or other (insomnia) 90 tablet 1    mirtazapine (REMERON) 30 MG tablet Take 1 tablet (30 mg) by mouth at bedtime. 90 tablet 0    ferrous sulfate (FEROSUL) 325 (65 Fe) MG tablet Take 1 tablet (325 mg) by mouth every other day (Patient not taking: Reported on 2/11/2025) 45 tablet 1     No current facility-administered medications for this visit. "       Objective:  LMP 02/05/2025 (Exact Date)   There is no height or weight on file to calculate BMI.  Gen: A/O x3, in NAD.  Appears well.  Cardio/Resp: Breathing comfortably, no audible distress  Neuro: Grossly intact.  Psych: Affect bright    Answers submitted by the patient for this visit:  Patient Health Questionnaire (Submitted on 3/5/2025)  If you checked off any problems, how difficult have these problems made it for you to do your work, take care of things at home, or get along with other people?: Very difficult  PHQ9 TOTAL SCORE: 17

## 2025-03-05 NOTE — PATIENT INSTRUCTIONS
1) Try to make sure you eat 2-3 meals per day!  More than just plain noodles.  You need vegetable, more complex carbohydrates (wheat noodles/bread/brown rice), and fruits and vegetables.  2) Make sure you are drinking electrolytes as well, or Salt Stik capsules for electrolytes.  Want to urinate every 3-4 months.
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
## 2025-03-24 ENCOUNTER — OFFICE VISIT (OUTPATIENT)
Dept: PSYCHIATRY | Facility: CLINIC | Age: 25
End: 2025-03-24
Payer: COMMERCIAL

## 2025-03-24 VITALS
BODY MASS INDEX: 21.2 KG/M2 | SYSTOLIC BLOOD PRESSURE: 114 MMHG | DIASTOLIC BLOOD PRESSURE: 73 MMHG | WEIGHT: 101.8 LBS | HEART RATE: 83 BPM

## 2025-03-24 DIAGNOSIS — F90.0 ATTENTION DEFICIT HYPERACTIVITY DISORDER (ADHD), PREDOMINANTLY INATTENTIVE TYPE: Primary | ICD-10-CM

## 2025-03-24 DIAGNOSIS — F43.10 POSTTRAUMATIC STRESS DISORDER: ICD-10-CM

## 2025-03-24 DIAGNOSIS — F33.0 MILD EPISODE OF RECURRENT MAJOR DEPRESSIVE DISORDER: ICD-10-CM

## 2025-03-24 PROCEDURE — 99213 OFFICE O/P EST LOW 20 MIN: CPT

## 2025-03-24 RX ORDER — MIRTAZAPINE 30 MG/1
30 TABLET, FILM COATED ORAL AT BEDTIME
Qty: 90 TABLET | Refills: 0 | Status: SHIPPED | OUTPATIENT
Start: 2025-03-24

## 2025-03-24 RX ORDER — DEXTROAMPHETAMINE SACCHARATE, AMPHETAMINE ASPARTATE, DEXTROAMPHETAMINE SULFATE AND AMPHETAMINE SULFATE 1.25; 1.25; 1.25; 1.25 MG/1; MG/1; MG/1; MG/1
10 TABLET ORAL DAILY
Qty: 60 TABLET | Refills: 0 | Status: SHIPPED | OUTPATIENT
Start: 2025-04-21

## 2025-03-24 RX ORDER — DEXTROAMPHETAMINE SACCHARATE, AMPHETAMINE ASPARTATE, DEXTROAMPHETAMINE SULFATE AND AMPHETAMINE SULFATE 1.25; 1.25; 1.25; 1.25 MG/1; MG/1; MG/1; MG/1
10 TABLET ORAL DAILY
Qty: 60 TABLET | Refills: 0 | Status: SHIPPED | OUTPATIENT
Start: 2025-03-24

## 2025-03-24 ASSESSMENT — PAIN SCALES - GENERAL: PAINLEVEL_OUTOF10: MILD PAIN (1)

## 2025-03-24 NOTE — NURSING NOTE
Chief Complaint   Patient presents with    Eval/Assessment     Attention deficit hyperactivity disorder (ADHD), predominantly inattentive type

## 2025-03-24 NOTE — PATIENT INSTRUCTIONS
Plan:  - Continue mirtazapine 30mg at bedtime   - Continue Adderall 10mg daily  - May take hydroxyzine 10-20mg three times daily as needed for anxiety / insomnia    **For crisis resources, please see the information at the end of this document**   Patient Education    Thank you for coming to the [facility] [place] [facility].     Lab Testing:  If you had lab testing today and your results are reassuring or normal they will be mailed to you or sent through [employer] within 7 days. If the lab tests need quick action we will call you with the results. The phone number we will call with results is # [phone]. If this is not the best number please call our clinic and change the number.     Medication Refills:  If you need any refills please call your pharmacy and they will contact us. Our fax number for refills is [phone].   Three business days of notice are needed for general medication refill requests.   Five business days of notice are needed for controlled substance refill requests.   If you need to change to a different pharmacy, please contact the new pharmacy directly. The new pharmacy will help you get your medications transferred.     Contact Us:  Please call [phone] during business hours (8-5:00 M-F).   If you have medication related questions after clinic hours, or on the weekend, please call [phone].     Financial Assistance [phone]   Medical Records [phone]       MENTAL HEALTH CRISIS RESOURCES:  For a emergency help, please call 911 or go to the nearest Emergency Department.     Emergency Walk-In Options:   [facility] @ [facility] [place] [facility] ([place]): [phone] - Specialized mental health emergency area designed to be calming  [facility] ([place]): [phone]  [facility] ([place]): [phone]  [facility] ([place]): [phone]    [facility] Crisis Information:   [place]:  [phone]  [place]: [phone]  [place] ([place]) - Adult: [phone]     Child: [phone]  [place] - Adult: [phone]     Child: [phone]  [place]: [phone]  List of all [facility] resources:   https://[place].gov/dhs/people-we-serve/adults/health-care/mental-health/resources/crisis-contacts.jsp    National Crisis Information:   Crisis Text Line: Text  [place]  to 848112  Suicide & Crisis Lifeline: 988  National Suicide Prevention Lifeline: 2-494-181-TALK (1-621.291.6851)       For online chat options, visit https://suicidepreventionlifeline.org/chat/  Poison Control Center: 3-149-785-6989  Trans Lifeline: 1-239.357.9970 - Hotline for transgender people of all ages  The [employer]: 3-038-722-9156 - Hotline for LGBT youth     For Non-Emergency Support:   Fast Tracker: Mental Health & Substance Use Disorder Resources -   https://www.C2FOckSkillSurveyn.org/

## 2025-04-11 ENCOUNTER — MYC REFILL (OUTPATIENT)
Dept: FAMILY MEDICINE | Facility: CLINIC | Age: 25
End: 2025-04-11
Payer: COMMERCIAL

## 2025-04-11 DIAGNOSIS — L85.8 KERATOSIS PILARIS: ICD-10-CM

## 2025-04-14 ENCOUNTER — HOSPITAL ENCOUNTER (OUTPATIENT)
Dept: NUTRITION | Facility: CLINIC | Age: 25
Discharge: HOME | End: 2025-04-14
Attending: STUDENT IN AN ORGANIZED HEALTH CARE EDUCATION/TRAINING PROGRAM | Admitting: STUDENT IN AN ORGANIZED HEALTH CARE EDUCATION/TRAINING PROGRAM
Payer: COMMERCIAL

## 2025-04-14 DIAGNOSIS — Z86.59 HISTORY OF EATING DISORDER: ICD-10-CM

## 2025-04-14 PROCEDURE — 97802 MEDICAL NUTRITION INDIV IN: CPT | Performed by: DIETITIAN, REGISTERED

## 2025-04-14 RX ORDER — AMMONIUM LACTATE 12 G/100G
LOTION TOPICAL 2 TIMES DAILY PRN
Qty: 225 G | Refills: 1 | Status: SHIPPED | OUTPATIENT
Start: 2025-04-14

## 2025-04-14 NOTE — TELEPHONE ENCOUNTER
ammonium lactate (LAC-HYDRIN) 12 % external lotion         Sig: Apply topically 2 times daily as needed for dry skin.    Disp: 225 g    Refills: 1    Start: 4/11/2025    Class: E-Prescribe    Non-formulary For: Keratosis pilaris    Last ordered: 2 months ago (2/12/2025) by Uri Estrada MD    Miscellaneous Dermatologic Agents Urnfib6204/11/2025 08:32 PM   Protocol Details Refill request is not for Imiquimod, 5-Fluorouracil, or Finasteride    Medication is active on med list and the sig matches. RN to manually verify dose and sig if red X/fail.    Recent (12 mo) or future (90 days) visit within the authorizing provider's specialty    Medication indicated for associated diagnosis    Patient is 24 mos old or older    No active pregnancy on record    No positive pregnancy test in past 12 months          Prescription approved per [facility] Refill Protocol.  Artem Chua, RN, MSN

## 2025-04-15 NOTE — DISCHARGE INSTRUCTIONS
GOALS  Eat 3 meals per day at consistent times   Start complete multivitamin and mineral with 1000 international unit(s) vitamin D

## 2025-04-15 NOTE — PROGRESS NOTES
OUTPATIENT NUTRITION ASSESSMENT   REASON FOR ASSESSMENT  Beth Real referred by Dr. Dave for MNT related to History of eating disorder [Z86.59] .    Patient accompanied by partner, Angus.       ASSESSMENT   Nutrition History:  - Information obtained from patient and partner.  - Patient is on a regular diet at home. Patient  has struggled with food for years. Patient states had an eating disorder in high school. Patient did not receive treatment for her eating disorder. Patient tends to restrict eating. Patient states was in track in high school so parents thought so was skinny from running. Patient her lowest weight was 88 lbs when do was taking mushrooms. Patient has inconsistent sleep and eating patterns. Patient may go to bed at 2 AM-4 AM and sleep until mid-afternoon. Patient describes herself as picky eater. Patient does not eat fish, cottage cheese or beans. Patient feels her food choices can be texture driven.     Patient states takes Adderall so doesn't feel hungry until 4 hours after taking it.     Patient being worked up for POTS.     Patient states feels tired all the time. Patient states gets 7 hours sleep     Fruit-apples, berries, banana, pomegranate, lavon  Vegetables: baby carrots, cucumber, peppers with ranch or hummus  Protein-steak, beef stick, cheese stick, cheese , eggs, yogurt, milk, peanut butter, almond butter with cinnamon, cashews, sunflower seeds     Diet Recall:  Breakfast: Ramen noodles with or without egg; 4 cheese sticks and 1/2 beef stick    Lunch: chelsie tea latte with boba; toast/bagel with butter; taquito   Dinner: hamburger without bun, fries and salad; frozen pizza; steak; taki; rice; pasta (7-10 PM)   Snack: taki; candy  Beverages: 3 cups tea (green and black tea) with milk   Dining out: varies          Exercise: Patient does not have an exercise regimen.      NUTRITION FOCUSED PHYSICAL ASSESSMENT (NFPA) FOR DIAGNOSING MALNUTRITION  Yes         Observed:   No  nutrition-related physical findings observed    Obtained from Chart/Interdisciplinary Team:  None noted     LABS  Labs reviewed    MEDICATIONS  Medications reviewed    ANTHROPOMETRICS   Height: 4'10:  Weight: 101 lbs per chart   BMI (kg/m2): 21 kg/m2   Weight Status:  Normal BMI  %IBW: 112%  Weight History:   Wt Readings from Last 10 Encounters:   02/11/25 43 kg (94 lb 12.8 oz)   01/28/25 42.9 kg (94 lb 9.6 oz)   01/14/25 43.1 kg (95 lb)   11/19/24 41.4 kg (91 lb 3.2 oz)   08/09/24 44.5 kg (98 lb 3.2 oz)   04/23/24 45.2 kg (99 lb 9.6 oz)   01/16/24 43.7 kg (96 lb 6.4 oz)   12/12/23 42.5 kg (93 lb 9.6 oz)   12/08/23 41.5 kg (91 lb 6.4 oz)   11/17/23 41.7 kg (92 lb)      ASSESSED NUTRITION NEEDS  Estimated Energy Needs: 0910-4330 kcals/day (30-35 Kcal/Kg)  Justification:  (maintenance)  Estimated Protein Needs: 46-55 grams protein/day (1-1.2 g pro/Kg)  Justification:  (preservation of lean body mass)  Estimated Fluid Needs: 4010-0910 mL/day (30-35 mL/kg)    ASSESSED MALNUTRITION STATUS  % Weight Loss:  None noted  % Intake:  Decreased intake does not meet criteria for malnutrition   Subcutaneous Fat Loss:  None observed  Loss of Muscle Mass:  None observed  Fluid Retention:  None noted    Malnutrition Diagnosis:  Patient does not meet two of the above criteria necessary for diagnosing malnutrition    DIAGNOSIS   Nutrition Diagnosis: Disordered eating related to disengagement with food as evidenced by current eating pattern, restricted eating behaviors and history of compensatory behaviors of purging        INTERVENTIONS   Nutrition Prescription   Recommend normalized eating pattern       IMPLEMENTATION   Assessed learning needs and learning preference  Teaching Method(s) used: Booklet / Handout  Explanation  Vitamin and Mineral Supplements: recommend complete multivitamin and mineral   Diet Education:  Provided education on normalized eating pattern   Nutrition Education (Content):   a)  Discussed current eating  pattern, history of eating disorder and picky eating. Patient appears to like a variety of foods but mainly dislikes certain textures of food. Discussed importance of consistent eating pattern for fuel and cognition. Recommend patient also consume adequate fluids. Reviewed portion sizes using food models for patient to view appropriate meal portions. Encouraged gradual diet and behavior change for long healthy relationship with food. Supported patient with the challenge of diet changes.    b)  Provided My Plate; What Counts as a Serving; Healthy Eating Plan   Nutrition Education (Application):   a)  Discussed current eating plans and offered suggestions for food options    b)  Patient verbalizes understanding of diet by stating will try to eat consistently   Expected patient engagement: fair-good   Other: Suggest checking vitamin D due to fatigue.     GOALS  Eat 3 meals per day at consistent times   Start complete multivitamin and mineral with 1000 international unit(s) vitamin D     FOLLOW UP/MONITORING   Progress towards goals will be monitored and evaluated per protocol and Practice Guidelines  Patient to follow up in 4 weeks   RD name and number provided     Time Spent with Patient  52 minutes     Darren Newton, RD, LD  [facility] Outpatient Dietitian  [phone] (office phone)

## 2025-05-12 ENCOUNTER — HOSPITAL ENCOUNTER (OUTPATIENT)
Dept: NUTRITION | Facility: CLINIC | Age: 25
Discharge: HOME | End: 2025-05-12
Admitting: INTERNAL MEDICINE
Payer: COMMERCIAL

## 2025-05-12 VITALS — BODY MASS INDEX: 21.22 KG/M2 | WEIGHT: 101.9 LBS

## 2025-05-12 PROCEDURE — 97803 MED NUTRITION INDIV SUBSEQ: CPT | Performed by: DIETITIAN, REGISTERED

## 2025-05-12 NOTE — PROGRESS NOTES
OUTPATIENT NUTRITION REASSESSMENT   REASON FOR ASSESSMENT  Beth Real referred by Dr. Dave for MNT related to History of eating disorder [Z86.59] .    Patient accompanied by partner, Angus.        ASSESSMENT   Nutrition History:  - Information obtained from patient and partner.  - Patient is on a regular diet at home. Patient states has struggled with food for years. Patient states had an eating disorder in high school. Patient did not receive treatment for her eating disorder. Patient tends to restrict eating. Patient states was in track in high school so parents thought so was skinny from running. Patient her lowest weight was 88 lbs when do was taking mushrooms. Patient has inconsistent sleep and eating patterns. Patient may go to bed at 2 AM-4 AM and sleep until mid-afternoon. Patient describes herself as picky eater. Patient does not eat fish, cottage cheese or beans. Patient feels her food choices can be texture driven.      Patient states takes Adderall so doesn't feel hungry until 4 hours after taking it.      Patient being worked up for POTS.      Patient states feels tired all the time. Patient states gets 7 hours sleep      Fruit-apples, berries, banana, pomegranate, lavon  Vegetables: baby carrots, cucumber, peppers with ranch or hummus  Protein-steak, beef stick, cheese stick, cheese , eggs, yogurt, milk, peanut butter, almond butter with cinnamon, cashews, sunflower seeds     5/12/2025 Patient has been starting to eat 3 meals per day and eating an actual serving. Patient states aware now aware when she needs to eat. Patient adding more variety to diet and adding foods to grocery list. Patient was on vacation the [place] and noticed her fitness was not strong. Patient wants to begin adding walking into her diet. Patient struggling with body image especially since clothes are fitting differently.     Tre kerrles   Sugar snap peas with hummus   Canned soup   Cake and cheese/crackers       Diet Recall:  Breakfast: Ramen noodles with or without egg; 4 cheese sticks and 1/2 beef stick    Lunch: chelsie tea latte with boba; toast/bagel with butter; taquito   Dinner: hamburger without bun, fries and salad; frozen pizza; steak; taki; rice; pasta (7-10 PM)   Snack: taki; candy  Beverages: 3 cups tea (green and black tea) with milk   Dining out: varies           Exercise: Patient does not have an exercise regimen.       NUTRITION FOCUSED PHYSICAL ASSESSMENT (NFPA) FOR DIAGNOSING MALNUTRITION  Yes         Observed:   No nutrition-related physical findings observed     Obtained from Chart/Interdisciplinary Team:  None noted      LABS  Labs reviewed     MEDICATIONS  Medications reviewed     ANTHROPOMETRICS   Height: 4'10:  Weight: 101.9 lbs    BMI (kg/m2): 21 kg/m2   Weight Status:  Normal BMI  %IBW: 112%  Weight History:   Wt Readings from Last 10 Encounters:   05/12/25 46.2 kg (101 lb 14.4 oz)   02/11/25 43 kg (94 lb 12.8 oz)   01/28/25 42.9 kg (94 lb 9.6 oz)   01/14/25 43.1 kg (95 lb)   11/19/24 41.4 kg (91 lb 3.2 oz)   08/09/24 44.5 kg (98 lb 3.2 oz)   04/23/24 45.2 kg (99 lb 9.6 oz)   01/16/24 43.7 kg (96 lb 6.4 oz)   12/12/23 42.5 kg (93 lb 9.6 oz)   12/08/23 41.5 kg (91 lb 6.4 oz)       ASSESSED NUTRITION NEEDS  Estimated Energy Needs: 9561-3547 kcals/day (30-35 Kcal/Kg)  Justification:  (maintenance)  Estimated Protein Needs: 46-55 grams protein/day (1-1.2 g pro/Kg)  Justification:  (preservation of lean body mass)  Estimated Fluid Needs: 9646-0335 mL/day (30-35 mL/kg)     ASSESSED MALNUTRITION STATUS  % Weight Loss:  None noted  % Intake:  Decreased intake does not meet criteria for malnutrition   Subcutaneous Fat Loss:  None observed  Loss of Muscle Mass:  None observed  Fluid Retention:  None noted     Malnutrition Diagnosis:  Patient does not meet two of the above criteria necessary for diagnosing malnutrition    EVALUATION/PROGRESS TOWARDS GOALS  Previous nutrition goals:  Eat 3 meals per day at  consistent times -improving  Start complete multivitamin and mineral with 1000 international unit(s) vitamin D -met     Previous nutrition diagnosis: Disordered eating related to disengagement with food as evidenced by current eating pattern, restricted eating behaviors and history of compensatory behaviors of purging -no change        Current nutrition diagnosis: Disordered eating related to disengagement with food as evidenced by current eating pattern, restricted eating behaviors and history of compensatory behaviors of purging         INTERVENTIONS   Nutrition Prescription   Recommend normalized eating pattern        IMPLEMENTATION   Assessed learning needs and learning preference  Teaching Method(s) used: Booklet / Handout  Explanation  Vitamin and Mineral Supplements: recommend complete multivitamin and mineral   Diet Education:  Provided education on normalized eating pattern   Nutrition Education (Content):              a)  Discussed progress towards goals and reviewed intake. Continued discussion on eating pattern, history of eating disorder and picky eating. Patient appears to like a variety of foods but mainly dislikes certain textures of food. Commended patient for focusing on consistent eating pattern. Reviewed importance of consistent eating pattern for fuel and cognition. Recommend patient also consume adequate fluids. Encouraged gradual diet and behavior change for long healthy relationship with food. Supported patient with the challenge of diet changes.               b)  Provided My Plate; What Counts as a Serving; Healthy Eating Plan -previous   Nutrition Education (Application):              a)  Discussed current eating plans and offered suggestions for food options               b)  Patient verbalizes understanding of diet by stating will try to eat consistently   Expected patient engagement: fair-good   Other: Suggest checking vitamin D due to fatigue.      GOALS  Eat 3 meals per day at  consistent times   Continue complete multivitamin and mineral with 1000 international unit(s) vitamin D   Add walking 2 times per week for 10 minutes      FOLLOW UP/MONITORING   Progress towards goals will be monitored and evaluated per protocol and Practice Guidelines  Patient to follow up in 6 weeks   RD name and number provided      Time Spent with Patient  25 minutes      Darren Newton, RD, LD  [facility] Outpatient Dietitian  [phone] (office phone)

## 2025-05-12 NOTE — DISCHARGE INSTRUCTIONS
GOALS  Eat 3 meals per day at consistent times   Continue complete multivitamin and mineral with 1000 international unit(s) vitamin D   Add walking 2 times per week for 10 minutes

## 2025-05-29 ENCOUNTER — VIRTUAL VISIT (OUTPATIENT)
Dept: PSYCHIATRY | Facility: CLINIC | Age: 25
End: 2025-05-29
Payer: COMMERCIAL

## 2025-05-29 DIAGNOSIS — F90.0 ATTENTION DEFICIT HYPERACTIVITY DISORDER (ADHD), PREDOMINANTLY INATTENTIVE TYPE: ICD-10-CM

## 2025-05-29 DIAGNOSIS — F43.10 POSTTRAUMATIC STRESS DISORDER: ICD-10-CM

## 2025-05-29 DIAGNOSIS — F33.0 MILD EPISODE OF RECURRENT MAJOR DEPRESSIVE DISORDER: Primary | ICD-10-CM

## 2025-05-29 RX ORDER — DEXTROAMPHETAMINE SACCHARATE, AMPHETAMINE ASPARTATE, DEXTROAMPHETAMINE SULFATE AND AMPHETAMINE SULFATE 2.5; 2.5; 2.5; 2.5 MG/1; MG/1; MG/1; MG/1
10 TABLET ORAL DAILY
Qty: 30 TABLET | Refills: 0 | Status: SHIPPED | OUTPATIENT
Start: 2025-06-26

## 2025-05-29 RX ORDER — MIRTAZAPINE 30 MG/1
30 TABLET, FILM COATED ORAL AT BEDTIME
Qty: 90 TABLET | Refills: 0 | Status: SHIPPED | OUTPATIENT
Start: 2025-05-29

## 2025-05-29 ASSESSMENT — PATIENT HEALTH QUESTIONNAIRE - PHQ9
SUM OF ALL RESPONSES TO PHQ QUESTIONS 1-9: 9
SUM OF ALL RESPONSES TO PHQ QUESTIONS 1-9: 9
10. IF YOU CHECKED OFF ANY PROBLEMS, HOW DIFFICULT HAVE THESE PROBLEMS MADE IT FOR YOU TO DO YOUR WORK, TAKE CARE OF THINGS AT HOME, OR GET ALONG WITH OTHER PEOPLE: SOMEWHAT DIFFICULT

## 2025-05-29 ASSESSMENT — PAIN SCALES - GENERAL: PAINLEVEL_OUTOF10: NO PAIN (0)

## 2025-05-29 NOTE — PROGRESS NOTES
[facility]  MEDICAL PROGRESS NOTE     Beth Real is a 24 year old referred by Dr. Traore for long-term medication management. Initial consultation on 03/24/25. See diagnostic assessment completed 02/24/25 by Dr. Traore.      CARE TEAM:   PCP- Milagros Dave, Jitendra Mederos, DO  Therapist- Ella Winkler with [facility] weekly since High School.   Dietician- Paula Worrell               Assessment & Plan     History and interview support the following diagnoses:   Posttraumatic stress disorder  Major depressive disorder, recurrent, mild  ADHD, predominantly inattentive  Insomnia, unspecified (anxiety related vs delayed phase circadian rhythm)  History of anorexia nervosa     Maribel is a pleasant 24-year-old with past psychiatric history of trauma, depression, insomnia, ADHD, and disordered eating patterns who presents for follow-up.    Maribel was last seen 03/25/25 at which time no medication changes were made. Today we addressed the following:    Mood/anxiety: Depression has been improving. Feeling more anxious since running out of Adderall. Denies self-harm thoughts or suicidal ideations. She is taking mirtazapine and reports good benefit without ASE. PHQ-9 improved.     Appetite: Eating 3 benito per day with some snacking in between. Continues to experience symptoms consistent with orthostasis however notes that symptoms improve with adequate nutrition/hydration. Recommend she continue to follow-up with cardiology for further work-up.     ADHD: She ran out of Adderall several days ago. Anxiety has been higher and motivation lower. Changing prescription from two 5mg tablets once daily to 10mg once daily. She notes appetite suppression that occurs for about 4 hours after taking Adderall however she tries to eat meals around this period of time.     No medication changes today.     Assessment from initial consultation on  03/24/25:  -Endorses history of disordered eating likely meeting clinical criteria for eating disorder, including AN around 9280-7165. She denies current symptom use but does note that she has a tendency to restrict food due to difficulty with choosing something to eat. She reports stable access to food. She denies purging, laxative misuse, or over-exercise. Distant history is significant for self-induced vomiting around manuel high. She reports recent weight gain and subsequent struggle with body image. She has never completed formal ED assessment or treatment. She is scheduled with a dietician in May.     -She was diagnosed with ADHD in childhood. She is prescribed Adderall IR 10mg daily which helps with energy and motivation; she notes that the lower dose of 5mg tends to exacerbate her anxiety. She does report some issues with appetite restriction related to stimulant use but notes that she tries to eat before medication use and after medication wears off. Will continue to monitor this.     -Reports that mood is stable although she tends to have some down days and feels pretty socially isolated. She is not currently working and relies a lot on her partner for socialization. History of suicidal ideation and self-harm, with past suicide attempts in 3rd grade, 11th grade, and 2021. Denies current safety concerns. 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    -Anxiety has been present throughout life, with early manifestations and ongoing challenges. PTSD with episodes of dissociation and age regression, exacerbated since 2021. Flashbacks occur monthly. She is prone to nightmares but doesn't remember her dreams; periodically will wake up in fear.     -She experienced recent acute illness December 2024. She suspects this was COVID but it was never confirmed. Since then, has been struggling with daily pre-syncopal episodes. She recently started PT for this and feels the exercises have been  beneficial for reducing symptom severity. I encouraged her to follow-up on the cardiology referral that PCP previously entered. We discussed how ED symptom use, including restriction, can exacerbate symptoms of orthostasis.    We did discuss options for medication changes however Maribel reports good benefit and tolerability from current regimen. She opts not to make changes today which I feel is reasonable.     No suicidal ideation, self-injurious behavior/urges, violent ideation, aggression, psychosis, hallucinations, delusions, bola/hypomania.     Future considerations:  -Consider sertraline, either as replacement or in addition to mirtazapine, for treating PTSD / anxiety symptoms   -Consider cyproheptadine as an alternative that could be a better sedative, could help with vivid dreams, and could better stimulate appetite     PSYCHOTROPIC DRUG INTERACTIONS: **Italicized interactions are for treatment plan options not currently implemented**  none    MANAGEMENT:  N/A    [place]: indicates that controlled prescriptions have been filled as prescribed                Plan    1) PSYCHOTROPIC MEDICATION RECOMMENDATIONS:  -Continue mirtazapine 30mg at bedtime   -Continue Adderall 10mg daily  -May take hydroxyzine 10-20mg three times daily as needed for anxiety / insomnia    2) THERAPY: Currently seeing Ella Winkler with [facility] [facility] [facility] weekly since High School.     3) NEXT DUE:   Labs- Routine monitoring is not indicated for current psychotropic medication regimen   ECG- Routine monitoring is not indicated for current psychotropic medication regimen     4) REFERRALS / COORDINATION: Recommend she follow-up on cardiology referral.    5) DISPOSITION: 2 months     Treatment Risk Statement:  The patient understands the risks, benefits, adverse effects and alternatives. Agrees to treatment with the capacity to do so. No medical contraindications to treatment. Agrees to contact provider for any problems. The  patient understands to call 911 or go to the nearest ED if urgent or life threatening symptoms occur. Crisis contact numbers are provided routinely in the After Visit Summary.    Suicide Risk Assessment: Maribel did not appear to be an imminent safety risk to self or others.    PROVIDER:  SHUBHAM Roman CNP              Interim History    Maribel was last seen 03/25/25 at which time no medication changes were made. Since the last visit:  -She's been doing alright. She's been out of Adderall for about a week. Motivation has been really low without Adderall.   -Anxiety has been higher; attributes this to running out of Adderall. Depression has been better lately.   -She's been sleeping more than usual; attributes this to changes in structure as she is not currently employed. Sleeping in until 11-12p.   -Feels that appetite has been better. She's been doing 3 small meals per day and snacking. She reports appetite suppression from Adderall (a 4 hour period).   -She has not followed up cardiology.   -One panic attack recently, fleeting thought of NSSI but did not get engage. Denies suicidal ideations.     Medication ASE: Denies  Medication adherence: Recently ran out of Adderall    Current medications:  Mirtazapine 30mg: Helps with sleep, denies oversedation. Has never been at a higher dose.  Hydroxyzine use: Rare- hasn't used for a few months  Adderall: 5mg makes her anxious, but the 10mg seems to work well for fatigue and feels less anxious at this dose.     Pertinent Negatives: No suicidal or violent ideation, psychosis, or hypo/bola.      Pertinent Social Hx:  FINANCIAL SUPPORT- Currently unemployed- she notes that she stopped working around Dec 2024 due to illness. Financially supported by parents, partner. In the process of job searching.   LIVING SITUATION / RELATIONSHIPS- Lives in house with 5 people total, including partner and 3 roommates. Has 2 cats. She has 3 siblings- 1 sister in [place], 1 brother in  [place], and another brother in [place] - all order than she is. Not related biologically.   SOCIAL/ SPIRITUAL SUPPORT- Partner is Angus, parents, several friends. Feels safe in current relationship. She enjoys swing dancing and crafts.    Pertinent Substance Use  Alcohol- occasional alcohol use, denies history of alcohol misuse.   Nicotine- Occasional vape use  Caffeine- Rare- none lately  Opioids- None  Narcan Kit- N/A  THC/CBD- Quit January 2025  Other Illicit Drugs- Denies. 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               Medical Review of Systems   Dizziness/orthostasis- Weekly pre-syncopal episodes since January 2025. Endorses SOB, tunnel vision, dizziness when she stands quickly. Reports pre-syncope; denies actual syncopal episodes.   Cardiovascular- Denies chest pain, tachycardia, palpitations  A comprehensive review of systems was performed and is negative other than noted above.              Psych Summary Points  03/2025: Initial psychiatric evaluation 03/24/25; no medication changes              Past Psychotropic Medications   Medication Max Dose (mg) Dates / Duration Helpful? DC Reason / Adverse Effects?   Prozac 20 2022       escitalopram 20 2021-23   Discontinuation syndrome   bupropion 300 2022-23       mirtazapine 30 12/2023       Benadryl           hydroxyzine 10-20 2021- yes     melatonin 10   yes     Ritalin LA 40 2890-8202 yes hyperfocus   Metadate ER 20 9428-2399       Adderall XR  10 12/2023- yes Some appetite suppression                 Past Medical History     Medication allergies:  No Known Allergies    Neurologic Hx [head injury, seizures, etc.]: No diagnosed seizures. Has had suspected functional seizures that have lasted up to 10 minutes, with crying during these episodes, and did seem to relate to stress and THC use. No head injuries.   Patient Active Problem List   Diagnosis    GHD (growth hormone deficiency)    Short stature    Esotropia     Diplopia    Myopia with astigmatism    Attention deficit hyperactivity disorder (ADHD), predominantly inattentive type    Acute pain of right knee    Major depression, recurrent    Posttraumatic stress disorder     Past Medical History:   Diagnosis Date    Growth hormone deficiency     Normal MRI     Short stature     Strabismus     ET     Contraception- IUD  Pregnancy- Denies              Medications   Current Outpatient Medications   Medication Sig Dispense Refill    ammonium lactate (LAC-HYDRIN) 12 % external lotion Apply topically 2 times daily as needed for dry skin. 225 g 1    amphetamine-dextroamphetamine (ADDERALL) 5 MG tablet Take 2 tablets (10 mg) by mouth daily. 60 tablet 0    amphetamine-dextroamphetamine (ADDERALL) 5 MG tablet Take 2 tablets (10 mg) by mouth daily. 60 tablet 0    hydrOXYzine (ATARAX) 10 MG tablet Take 1-2 tablets (10-20 mg) by mouth every 8 hours as needed for anxiety or other (insomnia) 90 tablet 1    mirtazapine (REMERON) 30 MG tablet Take 1 tablet (30 mg) by mouth at bedtime. 90 tablet 0    ferrous sulfate (FEROSUL) 325 (65 Fe) MG tablet Take 1 tablet (325 mg) by mouth every other day (Patient not taking: Reported on 5/29/2025) 45 tablet 1                 Physical Exam  (Vitals Only)  There were no vitals taken for this visit.    Pulse Readings from Last 5 Encounters:   03/24/25 83   02/11/25 94   01/28/25 90   01/14/25 91   11/19/24 78     Wt Readings from Last 5 Encounters:   05/12/25 46.2 kg (101 lb 14.4 oz)   03/24/25 46.2 kg (101 lb 12.8 oz)   02/11/25 43 kg (94 lb 12.8 oz)   01/28/25 42.9 kg (94 lb 9.6 oz)   01/14/25 43.1 kg (95 lb)     BP Readings from Last 5 Encounters:   03/24/25 114/73   02/11/25 98/58   01/28/25 97/64   01/14/25 93/60   11/19/24 97/64                   Mental Status Exam  Alertness: alert  and oriented  Appearance: adequately groomed  Behavior/Demeanor: cooperative, pleasant, and calm, with good eye contact   Speech: normal and regular rate and  rhythm  Language: intact and no problems  Psychomotor: normal or unremarkable  Mood: description consistent with euthymia  Affect: full range and appropriate; was congruent to mood  Thought Process/Associations: unremarkable  Thought Content:  Reports none;  Denies suicidal ideation, violent ideation, and delusions  Perception:  Reports none;  Denies auditory hallucinations and visual hallucinations  Insight: intact  Judgment: adequate for safety and intact  Cognition: does  appear grossly intact; formal cognitive testing was not done. Has some difficulty with memory/recall of previous events.  Gait and Station: Unable to assess (telehealth)                Data       3/24/2025     8:44 AM   PROMIS-10 Total Score w/o Sub Scores   PROMIS TOTAL - SUBSCORES 24        Patient-reported         3/24/2025     8:44 AM   CAGE-AID Total Score   Total Score 4    Total Score MyChart 4 (A total score of 2 or greater is considered clinically significant)       Patient-reported         1/28/2025     4:09 PM 3/5/2025     2:06 PM 5/29/2025    12:43 PM   PHQ   PHQ-9 Total Score 16  17  9    Q9: Thoughts of better off dead/self-harm past 2 weeks Several days Not at all Not at all   F/U: Thoughts of suicide or self-harm No     F/U: Safety concerns No         Patient-reported         1/16/2024     9:51 AM 8/9/2024    11:38 AM 11/14/2024     8:57 AM   SHAHRZAD-7 SCORE   Total Score   4 (minimal anxiety)   Total Score 9 2 4        Patient-reported       Liver/kidney function Metabolic Blood counts   Recent Labs   Lab Test 01/14/25  1659 11/02/23  1100   CR 0.56 0.50*   AST  --  32   ALT  --  36   ALKPHOS  --  53    Recent Labs   Lab Test 01/29/25  0811   TSH 5.17*    Recent Labs   Lab Test 01/14/25  1659   WBC 7.3   HGB 13.6   HCT 42.2   MCV 92           No results found for this or any previous visit.    Administrative Billing:     Level of Medical Decision Making:   - At least 1 chronic problem that is not stable  - Engaged in  prescription drug management during visit (discussed any medication benefits, side effects, alternatives, etc.)    The longitudinal plan of care for the diagnosis(es)/condition(s) as documented were addressed during this visit. Due to the added complexity in care, I will continue to support Maribel in the subsequent management and with ongoing continuity of care.

## 2025-05-29 NOTE — PROGRESS NOTES
Virtual Visit Details    Type of service:  Video Visit     Originating Location (pt. Location): Home    Distant Location (provider location):  Off-site  Platform used for Video Visit: Fatimah

## 2025-05-29 NOTE — PATIENT INSTRUCTIONS
Plan:  -Continue mirtazapine 30mg at bedtime   -Continue Adderall 10mg daily  -May take hydroxyzine 10-20mg three times daily as needed for anxiety / insomnia    **For crisis resources, please see the information at the end of this document**   Patient Education    Thank you for coming to the [facility] [place] [facility].     Lab Testing:  If you had lab testing today and your results are reassuring or normal they will be mailed to you or sent through MiFi within 7 days. If the lab tests need quick action we will call you with the results. The phone number we will call with results is # [phone]. If this is not the best number please call our clinic and change the number.     Medication Refills:  If you need any refills please call your pharmacy and they will contact us. Our fax number for refills is [phone].   Three business days of notice are needed for general medication refill requests.   Five business days of notice are needed for controlled substance refill requests.   If you need to change to a different pharmacy, please contact the new pharmacy directly. The new pharmacy will help you get your medications transferred.     Contact Us:  Please call [phone] during business hours (8-5:00 M-F).   If you have medication related questions after clinic hours, or on the weekend, please call [phone].     Financial Assistance [phone]   Medical Records [phone]       MENTAL HEALTH CRISIS RESOURCES:  For a emergency help, please call 911 or go to the nearest Emergency Department.     Emergency Walk-In Options:   [facility] Unit @ [facility] ([place]): [phone] - Specialized mental health emergency area designed to be calming  [facility] ([place]): [phone]  [facility] ([place]): [phone]  [facility] ([place]): [phone]    [facility] Crisis Information:   [place]:  [phone]  [place]: [phone]  [place] ([place]) - Adult: [phone]     Child: [phone]  [place] - Adult: [phone]     Child: [phone]  [place]: [phone]  List of all [facility] resources:   https://[place].gov/dhs/people-we-serve/adults/health-care/mental-health/resources/crisis-contacts.jsp    National Crisis Information:   Crisis Text Line: Text  [place]  to 431049  Suicide & Crisis Lifeline: 988  National Suicide Prevention Lifeline: 1-601-624-TALK (1-631.518.6107)       For online chat options, visit https://suicidepreventionlifeline.org/chat/  Poison Control Center: 6-996-965-4056  Trans Lifeline: 1-110.794.2722 - Hotline for transgender people of all ages  The [employer]: 3-444-695-6983 - Hotline for LGBT youth     For Non-Emergency Support:   Fast Tracker: Mental Health & Substance Use Disorder Resources -   https://www.[employer]Knewbi.comn.org/

## 2025-05-29 NOTE — NURSING NOTE
Current patient location: [address] [facility] [address]    Is the patient currently in the state of [place]? YES    Visit mode: VIDEO    If the visit is dropped, the patient can be reconnected by:VIDEO VISIT: Text to cell phone:   Telephone Information:   Mobile [phone]       Will anyone else be joining the visit? NO  (If patient encounters technical issues they should call [phone])    Are changes needed to the allergy or medication list? No    Are refills needed on medications prescribed by this physician? Discuss with provider    Rooming Documentation:  Questionnaire(s) completed    Reason for visit: RECHECK    Mónica REYESF

## 2025-07-14 ENCOUNTER — MYC REFILL (OUTPATIENT)
Dept: PSYCHIATRY | Facility: CLINIC | Age: 25
End: 2025-07-14
Payer: COMMERCIAL

## 2025-07-14 DIAGNOSIS — F43.10 POSTTRAUMATIC STRESS DISORDER: ICD-10-CM

## 2025-07-14 DIAGNOSIS — F90.0 ATTENTION DEFICIT HYPERACTIVITY DISORDER (ADHD), PREDOMINANTLY INATTENTIVE TYPE: ICD-10-CM

## 2025-07-14 DIAGNOSIS — F33.0 MILD EPISODE OF RECURRENT MAJOR DEPRESSIVE DISORDER: ICD-10-CM

## 2025-07-14 RX ORDER — DEXTROAMPHETAMINE SACCHARATE, AMPHETAMINE ASPARTATE, DEXTROAMPHETAMINE SULFATE AND AMPHETAMINE SULFATE 2.5; 2.5; 2.5; 2.5 MG/1; MG/1; MG/1; MG/1
10 TABLET ORAL DAILY
Qty: 30 TABLET | Refills: 0 | Status: CANCELLED | OUTPATIENT
Start: 2025-07-14

## 2025-07-14 RX ORDER — MIRTAZAPINE 30 MG/1
30 TABLET, FILM COATED ORAL AT BEDTIME
Qty: 90 TABLET | Refills: 0 | Status: CANCELLED | OUTPATIENT
Start: 2025-07-14

## 2025-07-24 ENCOUNTER — MYC MEDICAL ADVICE (OUTPATIENT)
Dept: PSYCHIATRY | Facility: CLINIC | Age: 25
End: 2025-07-24
Payer: COMMERCIAL

## 2025-07-24 ENCOUNTER — VIRTUAL VISIT (OUTPATIENT)
Dept: PSYCHIATRY | Facility: CLINIC | Age: 25
End: 2025-07-24
Payer: COMMERCIAL

## 2025-07-24 DIAGNOSIS — F90.0 ATTENTION DEFICIT HYPERACTIVITY DISORDER (ADHD), PREDOMINANTLY INATTENTIVE TYPE: ICD-10-CM

## 2025-07-24 DIAGNOSIS — F33.0 MILD EPISODE OF RECURRENT MAJOR DEPRESSIVE DISORDER: ICD-10-CM

## 2025-07-24 DIAGNOSIS — F43.10 POSTTRAUMATIC STRESS DISORDER: ICD-10-CM

## 2025-07-24 RX ORDER — DEXTROAMPHETAMINE SACCHARATE, AMPHETAMINE ASPARTATE, DEXTROAMPHETAMINE SULFATE AND AMPHETAMINE SULFATE 2.5; 2.5; 2.5; 2.5 MG/1; MG/1; MG/1; MG/1
10 TABLET ORAL DAILY
Qty: 30 TABLET | Refills: 0 | Status: CANCELLED | OUTPATIENT
Start: 2025-07-24

## 2025-07-24 RX ORDER — DEXTROAMPHETAMINE SACCHARATE, AMPHETAMINE ASPARTATE, DEXTROAMPHETAMINE SULFATE AND AMPHETAMINE SULFATE 2.5; 2.5; 2.5; 2.5 MG/1; MG/1; MG/1; MG/1
10 TABLET ORAL DAILY
Qty: 30 TABLET | Refills: 0 | Status: SHIPPED | OUTPATIENT
Start: 2025-08-21

## 2025-07-24 RX ORDER — MIRTAZAPINE 30 MG/1
30 TABLET, FILM COATED ORAL AT BEDTIME
Qty: 90 TABLET | Refills: 0 | Status: SHIPPED | OUTPATIENT
Start: 2025-07-24

## 2025-07-24 RX ORDER — DEXTROAMPHETAMINE SACCHARATE, AMPHETAMINE ASPARTATE, DEXTROAMPHETAMINE SULFATE AND AMPHETAMINE SULFATE 2.5; 2.5; 2.5; 2.5 MG/1; MG/1; MG/1; MG/1
10 TABLET ORAL DAILY
Qty: 30 TABLET | Refills: 0 | Status: SHIPPED | OUTPATIENT
Start: 2025-07-24

## 2025-07-24 RX ORDER — DEXTROAMPHETAMINE SACCHARATE, AMPHETAMINE ASPARTATE, DEXTROAMPHETAMINE SULFATE AND AMPHETAMINE SULFATE 1.25; 1.25; 1.25; 1.25 MG/1; MG/1; MG/1; MG/1
10 TABLET ORAL DAILY
Qty: 60 TABLET | Refills: 0 | Status: CANCELLED | OUTPATIENT
Start: 2025-07-24

## 2025-07-24 ASSESSMENT — PATIENT HEALTH QUESTIONNAIRE - PHQ9
SUM OF ALL RESPONSES TO PHQ QUESTIONS 1-9: 11
10. IF YOU CHECKED OFF ANY PROBLEMS, HOW DIFFICULT HAVE THESE PROBLEMS MADE IT FOR YOU TO DO YOUR WORK, TAKE CARE OF THINGS AT HOME, OR GET ALONG WITH OTHER PEOPLE: VERY DIFFICULT
SUM OF ALL RESPONSES TO PHQ QUESTIONS 1-9: 11

## 2025-07-24 ASSESSMENT — PAIN SCALES - GENERAL: PAINLEVEL_OUTOF10: MILD PAIN (2)

## 2025-07-24 NOTE — PROGRESS NOTES
Virtual Visit Details    Type of service:  Video Visit     Originating Location (pt. Location): Parked vehicle    Distant Location (provider location):  Off-site  Platform used for Video Visit: Fatimah

## 2025-07-24 NOTE — PROGRESS NOTES
[facility]  MEDICAL PROGRESS NOTE     Beth Real is a 24 year old referred by Dr. Traore for long-term medication management. Initial consultation on 03/24/25. See diagnostic assessment completed 02/24/25 by Dr. Traore.      CARE TEAM:   PCP- Milagros Dave, Jitendra Mederos, DO  Therapist- Ella Winkler with [employer] weekly since High School.   Dietician- Paula Worrell               Assessment & Plan     History and interview support the following diagnoses:   Posttraumatic stress disorder  Major depressive disorder, recurrent, mild  ADHD, predominantly inattentive  Insomnia, unspecified (anxiety related vs delayed phase circadian rhythm)  History of anorexia nervosa     Maribel is a pleasant 24-year-old with past psychiatric history of trauma, depression, insomnia, ADHD, and disordered eating patterns who presents for follow-up.    Maribel was last seen 05/29/25 at which time no changes were made. Today, Maribel reports an increased amount of overwhelm in the setting of recently starting a new job. Sleep has been more disrupted which is like due to a significant change in her sleep pattern; prior to working she normally woke for the day around 1pm and is now having to wake up around 9am for work. Discussed using low dose melatonin to reset her sleep pattern. She asks to increase Adderall however I would like her to first establish with a dietician for closely monitor of ED and I am hesitant to increase the dose further given presence of significant insomnia. Will re-assess in 2 months. Encouraged ongoing meal compliance.     Assessment from initial consultation on 03/24/25:  -Endorses history of disordered eating likely meeting clinical criteria for eating disorder, including AN around 3781-4294. She denies current symptom use but does note that she has a tendency to restrict food due to difficulty with choosing something to  eat. She reports stable access to food. She denies purging, laxative misuse, or over-exercise. Distant history is significant for self-induced vomiting around manuel high. She reports recent weight gain and subsequent struggle with body image. She has never completed formal ED assessment or treatment. She is scheduled with a dietician in May.     -She was diagnosed with ADHD in childhood. She is prescribed Adderall IR 10mg daily which helps with energy and motivation; she notes that the lower dose of 5mg tends to exacerbate her anxiety. She does report some issues with appetite restriction related to stimulant use but notes that she tries to eat before medication use and after medication wears off. Will continue to monitor this.     -Reports that mood is stable although she tends to have some down days and feels pretty socially isolated. She is not currently working and relies a lot on her partner for socialization. History of suicidal ideation and self-harm, with past suicide attempts in 3rd grade, 11th grade, and 2021. Denies current safety concerns. 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    -Anxiety has been present throughout life, with early manifestations and ongoing challenges. PTSD with episodes of dissociation and age regression, exacerbated since 2021. Flashbacks occur monthly. She is prone to nightmares but doesn't remember her dreams; periodically will wake up in fear.     -She experienced recent acute illness December 2024. She suspects this was COVID but it was never confirmed. Since then, has been struggling with daily pre-syncopal episodes. She recently started PT for this and feels the exercises have been beneficial for reducing symptom severity. I encouraged her to follow-up on the cardiology referral that PCP previously entered. We discussed how ED symptom use, including restriction, can exacerbate symptoms of orthostasis.    We did discuss options for medication  changes however Maribel reports good benefit and tolerability from current regimen. She opts not to make changes today which I feel is reasonable.     No suicidal ideation, self-injurious behavior/urges, violent ideation, aggression, psychosis, hallucinations, delusions, bola/hypomania.     Future considerations:  -Consider sertraline, either as replacement or in addition to mirtazapine, for treating PTSD / anxiety symptoms   -Consider cyproheptadine as an alternative that could be a better sedative, could help with vivid dreams, and could better stimulate appetite     PSYCHOTROPIC DRUG INTERACTIONS: **Italicized interactions are for treatment plan options not currently implemented**  none    MANAGEMENT:  N/A    [place]: indicates that controlled prescriptions have been filled as prescribed              Plan    1) PSYCHOTROPIC MEDICATION RECOMMENDATIONS:  -Continue mirtazapine 30mg at bedtime   -Continue Adderall 10mg daily  -May take hydroxyzine 10-20mg three times daily as needed for anxiety / insomnia  -Start melatonin 1mg nightly. Take approximately 2 hours prior to intended bedtime.     2) THERAPY: Currently seeing Ella Winkler with [facility] weekly since High School.     3) NEXT DUE:   Labs- Routine monitoring is not indicated for current psychotropic medication regimen   ECG- Routine monitoring is not indicated for current psychotropic medication regimen     4) REFERRALS / COORDINATION: Recommend she follow-up on cardiology referral.    5) DISPOSITION: 09/19 at 1pm    Treatment Risk Statement:  The patient understands the risks, benefits, adverse effects and alternatives. Agrees to treatment with the capacity to do so. No medical contraindications to treatment. Agrees to contact provider for any problems. The patient understands to call 911 or go to the nearest ED if urgent or life threatening symptoms occur. Crisis contact numbers are provided routinely in the After Visit  "Summary.    Suicide Risk Assessment: Maribel did not appear to be an imminent safety risk to self or others.    PROVIDER:  SHUBHAM Roman CNP              Interim History    Maribel was last seen 05/29/25 at which time no changes were made. Since the last visit:    She notes that she has \"been better.\" Life feels overwhelming; hard to get a full day of work done without breaking down, feeling exhausted. Doesn't really feel like anxiety or depression.    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     Sleep has been more difficult; not feeling like she's getting enough rest. She gets in bed around 10-11p but doesn't fall asleep until 1-3a, wakes up around 9am. She tosses and turns, fidgets, feels restless. Se's had insomnia her whole life with periods of improvement. It's been about a month since she noticed worsening. Prior to starting her new job she was getting up around 1pm.     She's been more consistent with eating 3 meals per day, taking electrolytes, staying hydrated.     Taking Adderall 5mg at 8am and 12p    Medication ASE: Denies    Current medications:  Mirtazapine 30mg: Helps with sleep, denies oversedation. Has never been at a higher dose.  Hydroxyzine use: Rare- hasn't used for a few months  Adderall: 5mg makes her anxious, but the 10mg seems to work well for fatigue and feels less anxious at this dose.     Pertinent Negatives: No suicidal or violent ideation, psychosis, or hypo/bola.      Pertinent Social Hx:  FINANCIAL SUPPORT- Currently unemployed- she notes that she stopped working around Dec 2024 due to illness. Financially supported by parents, partner. In the process of job searching.   LIVING SITUATION / RELATIONSHIPS- Lives in house with 5 people total, including partner and 3 roommates. Has 2 cats. She has 3 siblings- 1 sister in [place], 1 brother in [place], and another brother in [place] - all older than she is. Not related biologically. "   SOCIAL/ SPIRITUAL SUPPORT- Partner is Angus, parents, several friends. Feels safe in current relationship. She enjoys swing dancing and crafts.    Pertinent Substance Use  Alcohol- occasional alcohol use, denies history of alcohol misuse.   Nicotine- Occasional vape use  Caffeine- Rare- none lately  Opioids- None  Narcan Kit- N/A  THC/CBD- Quit January 2025  Other Illicit Drugs- Denies. 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               Medical Review of Systems   Dizziness/orthostasis- Weekly pre-syncopal episodes since January 2025. Endorses SOB, tunnel vision, dizziness when she stands quickly. Reports pre-syncope; denies actual syncopal episodes.  Cardiovascular- Denies chest pain, tachycardia, palpitations  A comprehensive review of systems was performed and is negative other than noted above.              Psych Summary Points  03/2025: Initial psychiatric evaluation 03/24/25; no medication changes              Past Psychotropic Medications   Medication Max Dose (mg) Dates / Duration Helpful? DC Reason / Adverse Effects?   Prozac 20 2022       escitalopram 20 2021-23   Discontinuation syndrome   bupropion 300 2022-23       mirtazapine 30 12/2023       Benadryl           hydroxyzine 10-20 2021- yes     melatonin 10   yes     Ritalin LA 40 2066-8130 yes hyperfocus   Metadate ER 20 [id number]       Adderall XR  10 12/2023- yes Some appetite suppression                 Past Medical History     Medication allergies:  No Known Allergies    Neurologic Hx [head injury, seizures, etc.]: No diagnosed seizures. Has had suspected functional seizures that have lasted up to 10 minutes, with crying during these episodes, and did seem to relate to stress and THC use. No head injuries.   Patient Active Problem List   Diagnosis    GHD (growth hormone deficiency)    Short stature    Esotropia    Diplopia    Myopia with astigmatism    Attention deficit hyperactivity disorder (ADHD),  predominantly inattentive type    Acute pain of right knee    Major depression, recurrent    Posttraumatic stress disorder     Past Medical History:   Diagnosis Date    Growth hormone deficiency     Normal MRI     Short stature     Strabismus     ET     Contraception- IUD  Pregnancy- Denies              Medications   Current Outpatient Medications   Medication Sig Dispense Refill    ammonium lactate (LAC-HYDRIN) 12 % external lotion Apply topically 2 times daily as needed for dry skin. 225 g 1    amphetamine-dextroamphetamine (ADDERALL) 10 MG tablet Take 1 tablet (10 mg) by mouth daily. 30 tablet 0    amphetamine-dextroamphetamine (ADDERALL) 5 MG tablet Take 2 tablets (10 mg) by mouth daily. 60 tablet 0    amphetamine-dextroamphetamine (ADDERALL) 5 MG tablet Take 2 tablets (10 mg) by mouth daily. 60 tablet 0    ferrous sulfate (FEROSUL) 325 (65 Fe) MG tablet Take 1 tablet (325 mg) by mouth every other day (Patient not taking: Reported on 5/29/2025) 45 tablet 1    hydrOXYzine (ATARAX) 10 MG tablet Take 1-2 tablets (10-20 mg) by mouth every 8 hours as needed for anxiety or other (insomnia) 90 tablet 1    mirtazapine (REMERON) 30 MG tablet Take 1 tablet (30 mg) by mouth at bedtime. 90 tablet 0                 Physical Exam  (Vitals Only)  There were no vitals taken for this visit.    Pulse Readings from Last 5 Encounters:   03/24/25 83   02/11/25 94   01/28/25 90   01/14/25 91   11/19/24 78     Wt Readings from Last 5 Encounters:   05/12/25 46.2 kg (101 lb 14.4 oz)   03/24/25 46.2 kg (101 lb 12.8 oz)   02/11/25 43 kg (94 lb 12.8 oz)   01/28/25 42.9 kg (94 lb 9.6 oz)   01/14/25 43.1 kg (95 lb)     BP Readings from Last 5 Encounters:   03/24/25 114/73   02/11/25 98/58   01/28/25 97/64   01/14/25 93/60   11/19/24 97/64                 Mental Status Exam  Alertness: alert  and oriented  Appearance: adequately groomed  Behavior/Demeanor: cooperative, pleasant, and calm, with good eye contact   Speech: normal and regular  "rate and rhythm  Language: intact and no problems  Psychomotor: normal or unremarkable  Mood: \"overwhelmed\"  Affect: full range and appropriate; was congruent to mood  Thought Process/Associations: unremarkable  Thought Content:  Reports none;  Denies suicidal ideation, violent ideation, and delusions  Perception:  Reports none;  Denies auditory hallucinations and visual hallucinations  Insight: intact  Judgment: adequate for safety and intact  Cognition: does  appear grossly intact; formal cognitive testing was not done. Has some difficulty with memory/recall of previous events.  Gait and Station: Unable to assess (telehealth)                Data       3/24/2025     8:44 AM   PROMIS-10 Total Score w/o Sub Scores   PROMIS TOTAL - SUBSCORES 24        Patient-reported         3/24/2025     8:44 AM   CAGE-AID Total Score   Total Score 4    Total Score MyChart 4 (A total score of 2 or greater is considered clinically significant)       Patient-reported         3/5/2025     2:06 PM 5/29/2025    12:43 PM 7/24/2025    12:26 PM   PHQ   PHQ-9 Total Score 17  9  11    Q9: Thoughts of better off dead/self-harm past 2 weeks Not at all Not at all Not at all       Patient-reported         1/16/2024     9:51 AM 8/9/2024    11:38 AM 11/14/2024     8:57 AM   SHAHRZAD-7 SCORE   Total Score   4 (minimal anxiety)   Total Score 9 2 4        Patient-reported       Liver/kidney function Metabolic Blood counts   Recent Labs   Lab Test 01/14/25  1659 11/02/23  1100   CR 0.56 0.50*   AST  --  32   ALT  --  36   ALKPHOS  --  53    Recent Labs   Lab Test 01/29/25  0811   TSH 5.17*    Recent Labs   Lab Test 01/14/25  1659   WBC 7.3   HGB 13.6   HCT 42.2   MCV 92           No results found for this or any previous visit.    Administrative Billing:     Level of Medical Decision Making:   - At least 1 chronic problem that is not stable  - Engaged in prescription drug management during visit (discussed any medication benefits, side effects, " alternatives, etc.)    The longitudinal plan of care for the diagnosis(es)/condition(s) as documented were addressed during this visit. Due to the added complexity in care, I will continue to support Maribel in the subsequent management and with ongoing continuity of care.

## 2025-07-24 NOTE — PATIENT INSTRUCTIONS
Plan:  -Continue mirtazapine 30mg at bedtime   -Continue Adderall 10mg daily  -May take hydroxyzine 10-20mg three times daily as needed for anxiety / insomnia  -Start melatonin 1mg nightly. Take approximately 2 hours prior to intended bedtime.     **For crisis resources, please see the information at the end of this document**   Patient Education    Thank you for coming to the [facility] [place] [facility].     Lab Testing:  If you had lab testing today and your results are reassuring or normal they will be mailed to you or sent through [email or web address] within 7 days. If the lab tests need quick action we will call you with the results. The phone number we will call with results is # [phone]. If this is not the best number please call our clinic and change the number.     Medication Refills:  If you need any refills please call your pharmacy and they will contact us. Our fax number for refills is [phone].   Three business days of notice are needed for general medication refill requests.   Five business days of notice are needed for controlled substance refill requests.   If you need to change to a different pharmacy, please contact the new pharmacy directly. The new pharmacy will help you get your medications transferred.     Contact Us:  Please call [phone] during business hours (8-5:00 M-F).   If you have medication related questions after clinic hours, or on the weekend, please call [phone].     Financial Assistance [phone]   Medical Records [phone]       MENTAL HEALTH CRISIS RESOURCES:  For a emergency help, please call 911 or go to the nearest Emergency Department.     Emergency Walk-In Options:   [facility] Unit @ [facility] ([place]): [phone] - Specialized mental health emergency area designed to be calming  [facility] ([place]): [phone]  [facility] ([place]): [phone]  [facility]  Center ([place]): [phone]    [facility] Crisis Information:   [place]: [phone]  [place]: [phone]  [place] ([place]) - Adult: [phone]     Child: [phone]  [place] - Adult: [phone]     Child: [phone]  [place]: [phone]  List of all [facility] resources:   [email or web address]-[email or web address]-[email or web address]/resources/crisis-contacts.jsp    National Crisis Information:   Crisis Text Line: Text  [place]  to 505523  Suicide & Crisis Lifeline: 988  National Suicide Prevention Lifeline: 6-781-300-RXFY (1-434.319.6864)       For online chat options, visit https://suicidepreventionlifeline.org/chat/  Poison Control Center: 1-528.246.1146  Trans Lifeline: 1-334.388.6347 - Hotline for transgender people of all ages  [employer]: 2-010-461-8310 - Hotline for LGBT youth     For Non-Emergency Support:   Fast Tracker: Mental Health & Substance Use Disorder Resources -   https://www.[employer].org/

## 2025-07-24 NOTE — NURSING NOTE
Current patient location: [address] [facility] [address]    Is the patient currently in the state of [place]? YES    Visit mode: VIDEO    If the visit is dropped, the patient can be reconnected by:VIDEO VISIT: Text to cell phone:   Telephone Information:   Mobile [phone]       Will anyone else be joining the visit? NO  (If patient encounters technical issues they should call [phone]    Are changes needed to the allergy or medication list? Pt stated no changes to allergies and Pt stated no med changes    Are refills needed on medications prescribed by this physician? YES    Rooming Documentation:  Questionnaire(s) completed    Reason for visit: RECHECK    Patricia REYESF